# Patient Record
Sex: FEMALE | Race: BLACK OR AFRICAN AMERICAN
[De-identification: names, ages, dates, MRNs, and addresses within clinical notes are randomized per-mention and may not be internally consistent; named-entity substitution may affect disease eponyms.]

---

## 2018-02-19 ENCOUNTER — HOSPITAL ENCOUNTER (INPATIENT)
Dept: HOSPITAL 62 - ER | Age: 78
LOS: 3 days | Discharge: HOME HEALTH | DRG: 683 | End: 2018-02-22
Attending: FAMILY MEDICINE | Admitting: FAMILY MEDICINE
Payer: MEDICARE

## 2018-02-19 ENCOUNTER — HOSPITAL ENCOUNTER (OUTPATIENT)
Dept: HOSPITAL 62 - OD | Age: 78
End: 2018-02-19
Attending: FAMILY MEDICINE
Payer: MEDICARE

## 2018-02-19 DIAGNOSIS — Z79.899: ICD-10-CM

## 2018-02-19 DIAGNOSIS — Z53.9: Primary | ICD-10-CM

## 2018-02-19 DIAGNOSIS — Z87.891: ICD-10-CM

## 2018-02-19 DIAGNOSIS — I10: ICD-10-CM

## 2018-02-19 DIAGNOSIS — Z90.49: ICD-10-CM

## 2018-02-19 DIAGNOSIS — E86.0: ICD-10-CM

## 2018-02-19 DIAGNOSIS — E03.9: ICD-10-CM

## 2018-02-19 DIAGNOSIS — B95.1: ICD-10-CM

## 2018-02-19 DIAGNOSIS — E78.5: ICD-10-CM

## 2018-02-19 DIAGNOSIS — K21.9: ICD-10-CM

## 2018-02-19 DIAGNOSIS — E87.1: ICD-10-CM

## 2018-02-19 DIAGNOSIS — Z88.6: ICD-10-CM

## 2018-02-19 DIAGNOSIS — N17.9: Primary | ICD-10-CM

## 2018-02-19 LAB
ADD MANUAL DIFF: NO
ALBUMIN SERPL-MCNC: 4.6 G/DL (ref 3.5–5)
ALP SERPL-CCNC: 82 U/L (ref 38–126)
ALT SERPL-CCNC: 23 U/L (ref 9–52)
ANION GAP SERPL CALC-SCNC: 17 MMOL/L (ref 5–19)
AST SERPL-CCNC: 29 U/L (ref 14–36)
BASOPHILS # BLD AUTO: 0 10^3/UL (ref 0–0.2)
BASOPHILS NFR BLD AUTO: 0.6 % (ref 0–2)
BILIRUB DIRECT SERPL-MCNC: 0.6 MG/DL (ref 0–0.4)
BILIRUB SERPL-MCNC: 0.9 MG/DL (ref 0.2–1.3)
BUN SERPL-MCNC: 29 MG/DL (ref 7–20)
CALCIUM: 9.4 MG/DL (ref 8.4–10.2)
CHLORIDE SERPL-SCNC: 84 MMOL/L (ref 98–107)
CO2 SERPL-SCNC: 27 MMOL/L (ref 22–30)
CREAT UR-MCNC: 216.8 MG/DL (ref 15–278)
EOSINOPHIL # BLD AUTO: 0.1 10^3/UL (ref 0–0.6)
EOSINOPHIL NFR BLD AUTO: 1 % (ref 0–6)
ERYTHROCYTE [DISTWIDTH] IN BLOOD BY AUTOMATED COUNT: 14.3 % (ref 11.5–14)
GLUCOSE SERPL-MCNC: 89 MG/DL (ref 75–110)
HCT VFR BLD CALC: 38.9 % (ref 36–47)
HGB BLD-MCNC: 13.1 G/DL (ref 12–15.5)
LYMPHOCYTES # BLD AUTO: 1.7 10^3/UL (ref 0.5–4.7)
LYMPHOCYTES NFR BLD AUTO: 24.9 % (ref 13–45)
MCH RBC QN AUTO: 32.9 PG (ref 27–33.4)
MCHC RBC AUTO-ENTMCNC: 33.5 G/DL (ref 32–36)
MCV RBC AUTO: 98 FL (ref 80–97)
MONOCYTES # BLD AUTO: 0.8 10^3/UL (ref 0.1–1.4)
MONOCYTES NFR BLD AUTO: 11.7 % (ref 3–13)
NEUTROPHILS # BLD AUTO: 4.3 10^3/UL (ref 1.7–8.2)
NEUTS SEG NFR BLD AUTO: 61.8 % (ref 42–78)
OSMOLALITY,URINE: 237 MOSM/KG (ref 300–900)
PLATELET # BLD: 276 10^3/UL (ref 150–450)
POTASSIUM SERPL-SCNC: 3.8 MMOL/L (ref 3.6–5)
PROT SERPL-MCNC: 8.4 G/DL (ref 6.3–8.2)
RBC # BLD AUTO: 3.97 10^6/UL (ref 3.72–5.28)
SODIUM SERPL-SCNC: 127.6 MMOL/L (ref 137–145)
TOTAL CELLS COUNTED % (AUTO): 100 %
URINE SODIUM: 22 MMOL/L (ref 30–90)
WBC # BLD AUTO: 6.9 10^3/UL (ref 4–10.5)

## 2018-02-19 PROCEDURE — 84300 ASSAY OF URINE SODIUM: CPT

## 2018-02-19 PROCEDURE — 82550 ASSAY OF CK (CPK): CPT

## 2018-02-19 PROCEDURE — 99285 EMERGENCY DEPT VISIT HI MDM: CPT

## 2018-02-19 PROCEDURE — 93880 EXTRACRANIAL BILAT STUDY: CPT

## 2018-02-19 PROCEDURE — 76770 US EXAM ABDO BACK WALL COMP: CPT

## 2018-02-19 PROCEDURE — 80053 COMPREHEN METABOLIC PANEL: CPT

## 2018-02-19 PROCEDURE — 36415 COLL VENOUS BLD VENIPUNCTURE: CPT

## 2018-02-19 PROCEDURE — 93976 VASCULAR STUDY: CPT

## 2018-02-19 PROCEDURE — 83935 ASSAY OF URINE OSMOLALITY: CPT

## 2018-02-19 PROCEDURE — 80048 BASIC METABOLIC PNL TOTAL CA: CPT

## 2018-02-19 PROCEDURE — 70450 CT HEAD/BRAIN W/O DYE: CPT

## 2018-02-19 PROCEDURE — 85025 COMPLETE CBC W/AUTO DIFF WBC: CPT

## 2018-02-19 PROCEDURE — 82570 ASSAY OF URINE CREATININE: CPT

## 2018-02-19 PROCEDURE — 93306 TTE W/DOPPLER COMPLETE: CPT

## 2018-02-19 PROCEDURE — 96360 HYDRATION IV INFUSION INIT: CPT

## 2018-02-19 PROCEDURE — 87088 URINE BACTERIA CULTURE: CPT

## 2018-02-19 PROCEDURE — 87086 URINE CULTURE/COLONY COUNT: CPT

## 2018-02-19 PROCEDURE — 83930 ASSAY OF BLOOD OSMOLALITY: CPT

## 2018-02-19 RX ADMIN — SODIUM CHLORIDE PRN ML: 9 INJECTION, SOLUTION INTRAVENOUS at 23:34

## 2018-02-19 NOTE — ER DOCUMENT REPORT
ED Medical Screen (RME)





- General


Chief Complaint: Abnormal Lab Results


Stated Complaint: POSSIBLE LOW SODIUM


Time Seen by Provider: 02/19/18 19:53


Mode of Arrival: Wheelchair


Information source: Patient, Relative


Notes: 





78-year-old female presented ED for complaint of low sodium.  She states that 

she went to Dr. Gibbs's office last week he enzo blood and then he called her 

today told her to go and get blood drawn and then come to his office.  When she 

got it to his office today she was told to come to the emergency room to get 

admitted for her low sodium.  She has a set of labs with her from results from 

the doctor's office.  She states she is also hurting from her arthritis that is 

chronic.  Patient is alert and oriented.








I have greeted and performed a rapid initial assessment of this patient.  A 

comprehensive ED assessment and evaluation of the patient, analysis of test 

results and completion of medical decision making process will be conducted by 

an additional ED providers.


TRAVEL OUTSIDE OF THE [place] IN LAST 30 DAYS: No





- Related Data


Allergies/Adverse Reactions: 


 





aspirin Allergy (Verified 02/19/18 18:19)


 











Physical Exam





- Vital signs


Vitals: 





 











Temp Pulse Resp BP Pulse Ox


 


 98.5 F   76   21 H  151/70 H  99 


 


 02/19/18 18:43  02/19/18 18:43  02/19/18 18:43  02/19/18 18:43  02/19/18 18:43














Course





- Vital Signs


Vital signs: 





 











Temp Pulse Resp BP Pulse Ox


 


 98.5 F   76   21 H  151/70 H  99 


 


 02/19/18 18:43  02/19/18 18:43  02/19/18 18:43  02/19/18 18:43  02/19/18 18:43

## 2018-02-19 NOTE — ER DOCUMENT REPORT
ED General





- General


Chief Complaint: Abnormal Lab Results


Stated Complaint: POSSIBLE LOW SODIUM


Time Seen by Provider: 02/19/18 19:53


Mode of Arrival: Wheelchair


Information source: Relative


TRAVEL OUTSIDE OF THE [place] IN LAST 30 DAYS: No





- HPI


Notes: 





78-year-old lady presented today from Dr. Kendrick's office for evaluation of 

electrolyte abnormalities.  Patient was seen at the office today and had lab 

work drawn and noted to have low sodium.  Patient was referred here for 

admission.  After speaking with family patient has been feeling generally tired 

and fatigued, patient has chronic arthritic pain in her knees.  Patient also 

has been feeling slightly cold and places where people normally feel warm.  No 

chest pain, no shortness of breath, no palpitations.





- Related Data


Allergies/Adverse Reactions: 


 





aspirin Allergy (Verified 02/19/18 18:19)


 











Past Medical History





- General


Information source: Patient, Relative





- Social History


Smoking Status: Never Smoker


Family History: None





Review of Systems





- Review of Systems


Notes: 





REVIEW OF SYSTEMS:


 CONSTITUTIONAL: -fevers, -chills, + generalized fatigue, + tired


 EENT: -eye pain, + knee pain difficulty swallowing, -nasal congestion


 CARDIOVASCULAR: -chest pain, -syncope.


 RESPIRATORY: -cough, -SOB


 GASTROINTESTINAL: -abdominal pain, -nausea, -vomiting, -diarrhea


 GENITOURINARY: -dysuria, -hematuria


 MUSCULOSKELETAL: -back pain, -neck pain, + knee pain


 SKIN: -rash or skin lesions.


 HEMATOLOGIC: -easy bruising or bleeding.


 LYMPHATIC: -swollen, enlarged glands.


 NEUROLOGICAL: -altered mental status or loss of consciousness, -headache, -

neurologic symptoms


 PSYCHIATRIC: -anxiety, -depression.


 ALL OTHER SYSTEMS REVIEWED AND NEGATIVE.





Physical Exam





- Vital signs


Vitals: 


 











Temp Pulse Resp BP Pulse Ox


 


 98.5 F   77   21 H  151/70 H  99 


 


 02/19/18 18:43  02/19/18 18:43  02/19/18 18:43  02/19/18 18:43  02/19/18 18:43














- Notes


Notes: 





Reviewed vital signs and nursing note as charted by RN. 


CONSTITUTIONAL: Alert and oriented


HEAD: Normocephalic; atraumatic


EYES: PERRL


ENT: normal nose; no rhinorrhea; dry mucous membranes


NECK: Supple without meningismus; non-tender; no cervical lymphadenopathy, no 

masses


CARD: Bradycardia; no murmurs, no clicks, no rubs, no gallops; symmetric distal 

pulses


RESP: Normal chest excursion without splinting or tachypnea; breath sounds 

clear and equal bilaterally


ABD/GI: Normal bowel sounds; non-distended; soft,


BACK:  The back appears normal and is non-tender to palpation


EXT: Normal ROM in all joints; non-tender to palpation; no cyanosis, no 

effusions, no edema   


SKIN: Normal color for age and race; warm; dry; good turgor; capillary refill < 

2 seconds; no acute lesions noted


NEURO: .Cranial nerves 3-12 intact. Motor strength 5/5 bilaterally. Sensation 

intact to touch bilaterally. No pronator drift. Finger to nose intact 

bilaterally


PSYCH: The patient's mood and manner are appropriate. Grooming and personal 

hygiene are appropriate.








Course





- Re-evaluation


Re-evalutation: 





02/19/18 20:51


78-year-old here for evaluation of abnormal lab findings


Patient is here from the office of Dr. Kendrick


I have reviewed the lab work and noted that patient has worsening kidney 

function with notable hyponatremia of 120, patient also has elevated TSH back 

and be contributing to her electrolyte abnormalities


We will give patient gentle hydration


We will obtain basic lab work, will consult Dr. Kendrick





02/19/18 22:16


Reassessment 10 PM


Patient has improvement of her sodium compared to her sodium 2 days prior


Patient does have worsening kidney function, discussed the case with Dr. Kendrick, 

agree with admission for further workup


Recommended ultrasound of the kidneys, will obtain that


I will add on urine as well as CK level


Admit to inpatient





- Vital Signs


Vital signs: 


 











Temp Pulse Resp BP Pulse Ox


 


 98.5 F   76   21 H  151/70 H  98 


 


 02/19/18 18:43  02/19/18 18:43  02/19/18 18:43  02/19/18 18:43  02/19/18 20:18














- Laboratory


Result Diagrams: 


 02/19/18 20:45





 02/19/18 20:45


Laboratory results interpreted by me: 


 











  02/19/18 02/19/18





  20:45 20:45


 


MCV  98 H 


 


RDW  14.3 H 


 


Sodium   127.6 L


 


Chloride   84 L


 


BUN   29 H


 


Creatinine   2.67 H


 


Est GFR ( Amer)   21 L


 


Est GFR (Non-Af Amer)   17 L


 


Direct Bilirubin   0.6 H


 


Total Protein   8.4 H














Discharge





- Discharge


Clinical Impression: 


 LUKE (acute kidney injury), Hyponatremia





Condition: Stable


Disposition: ADMITTED AS INPATIENT


Admitting Provider: Sai


Unit Admitted: IMCU


Referrals: 


IVONNE KENDRICK MD [Primary Care Provider] - Follow up as needed

## 2018-02-20 LAB
ADD MANUAL DIFF: NO
ANION GAP SERPL CALC-SCNC: 12 MMOL/L (ref 5–19)
BASOPHILS # BLD AUTO: 0 10^3/UL (ref 0–0.2)
BASOPHILS NFR BLD AUTO: 0.6 % (ref 0–2)
BUN SERPL-MCNC: 33 MG/DL (ref 7–20)
CALCIUM: 8.8 MG/DL (ref 8.4–10.2)
CHLORIDE SERPL-SCNC: 91 MMOL/L (ref 98–107)
CO2 SERPL-SCNC: 27 MMOL/L (ref 22–30)
EOSINOPHIL # BLD AUTO: 0.1 10^3/UL (ref 0–0.6)
EOSINOPHIL NFR BLD AUTO: 2.3 % (ref 0–6)
ERYTHROCYTE [DISTWIDTH] IN BLOOD BY AUTOMATED COUNT: 14 % (ref 11.5–14)
GLUCOSE SERPL-MCNC: 87 MG/DL (ref 75–110)
HCT VFR BLD CALC: 33.6 % (ref 36–47)
HGB BLD-MCNC: 11.3 G/DL (ref 12–15.5)
LYMPHOCYTES # BLD AUTO: 1.6 10^3/UL (ref 0.5–4.7)
LYMPHOCYTES NFR BLD AUTO: 24.1 % (ref 13–45)
MCH RBC QN AUTO: 32.4 PG (ref 27–33.4)
MCHC RBC AUTO-ENTMCNC: 33.6 G/DL (ref 32–36)
MCV RBC AUTO: 96 FL (ref 80–97)
MONOCYTES # BLD AUTO: 0.7 10^3/UL (ref 0.1–1.4)
MONOCYTES NFR BLD AUTO: 10.5 % (ref 3–13)
NEUTROPHILS # BLD AUTO: 4 10^3/UL (ref 1.7–8.2)
NEUTS SEG NFR BLD AUTO: 62.5 % (ref 42–78)
PLATELET # BLD: 239 10^3/UL (ref 150–450)
POTASSIUM SERPL-SCNC: 3.4 MMOL/L (ref 3.6–5)
RBC # BLD AUTO: 3.49 10^6/UL (ref 3.72–5.28)
SODIUM SERPL-SCNC: 130.3 MMOL/L (ref 137–145)
TOTAL CELLS COUNTED % (AUTO): 100 %
WBC # BLD AUTO: 6.5 10^3/UL (ref 4–10.5)

## 2018-02-20 RX ADMIN — AMLODIPINE BESYLATE SCH MG: 5 TABLET ORAL at 21:32

## 2018-02-20 RX ADMIN — AMLODIPINE BESYLATE SCH MG: 5 TABLET ORAL at 10:08

## 2018-02-20 RX ADMIN — HYDRALAZINE HYDROCHLORIDE SCH MG: 25 TABLET, FILM COATED ORAL at 14:19

## 2018-02-20 RX ADMIN — ATORVASTATIN CALCIUM SCH MG: 10 TABLET, FILM COATED ORAL at 21:32

## 2018-02-20 RX ADMIN — HYDRALAZINE HYDROCHLORIDE SCH MG: 25 TABLET, FILM COATED ORAL at 21:32

## 2018-02-20 RX ADMIN — LANSOPRAZOLE SCH MG: 30 TABLET, ORALLY DISINTEGRATING, DELAYED RELEASE ORAL at 10:08

## 2018-02-20 RX ADMIN — HYDRALAZINE HYDROCHLORIDE SCH MG: 25 TABLET, FILM COATED ORAL at 06:03

## 2018-02-20 RX ADMIN — ENOXAPARIN SODIUM SCH MG: 30 INJECTION SUBCUTANEOUS at 10:07

## 2018-02-20 RX ADMIN — SODIUM CHLORIDE PRN ML: 9 INJECTION, SOLUTION INTRAVENOUS at 18:00

## 2018-02-20 RX ADMIN — LEVOTHYROXINE SODIUM SCH MG: 25 TABLET ORAL at 10:08

## 2018-02-20 NOTE — RADIOLOGY REPORT (SQ)
EXAM DESCRIPTION: U/S RETROPERITON (RENAL/AORTA)



CLINICAL HISTORY: worsening kidney function



COMPARISON: None.



TECHNIQUE: Real-time sonographic images of the retroperitoneum

were obtained using a curved multihertz transducer.



FINDINGS:







The right kidney measures 9.1 cm in length. The left kidney

measures 10.4 cm in length. No solid renal mass, shadowing renal

calculi, or hydronephrosis. 1.9 cm superior pole right renal

cyst.



Possible echogenic debris within the urinary bladder. Normal

urinary bladder wall thickness.









IMPRESSION:



1. Possible echogenic debris in the urinary bladder. Correlation

with urinalysis recommended.



2. No definite abnormality identified in the kidneys.

## 2018-02-20 NOTE — PDOC H&P
History of Present Illness


Admission Date/PCP: 


  18 22:17





  IVONNE KENDRICK MD





Patient complains of: Acute renal failure and hyponatremia


History of Present Illness: 


ONEL GORDON is a 78 year old female


This 78-year-old females came to the office last month up to 4 years not taking 

any blood pressure medications and blood pressure was running 190/110 range 

with completely asymptomatic patient'sAnd the patient's initially started on 

her Diovan HCTZ which patient was taking exforge  in the pastAnd then will get 

down to slowly week by week for the last 3 weeks the blood pressures to 150-160 

range and also add the Norvasc 5 mg twice a day


Last Monday patient's sodium was completely normal and creatinine was 1.03 with 

a recheck again the patient's creatinine was good to up to 1.69 and patient's 

sodium was 120


Patient's brought back to the office patient was complaining some mild weakness 

and some dizziness decided to send to the emergency departments will repeat 

sodium was 127 but patient's creatinine was 2.69 and decided to admit for 

further evaluations for the acute renal failure and hyponatremia


Patient's denied any chest pain denied any shortness of the breath denied any 

headache but complaining some dizzy feeling


Patient also have a hypothyroidism which is uncontrolled with the patient's 

came was about 35 TSH and patient was started on the levothyroxine's coming 

back to the 11 range


When I saw the patient on the floor patients feeling better after starting the 

IV fluidsDenied any headache denied any bloody wheezing denied any dizziness 

denied any chest pain





Past Medical History


Cardiac Medical History: Reports: Hyperlipidema, Hypertension


GI Medical History: Reports: Gastroesophageal Reflux Disease





Past Surgical History


Past Surgical History: Reports:  Section, Cholecystectomy





Social History


Smoking Status: Former Smoker


Last Time Smoked: 


Frequency of Alcohol Use: None


Hx Prescription Drug Abuse: No





- Advance Directive


Resuscitation Status: Full Code





Family History


Family History: None, Reviewed & Not Pertinent


Parental Family History Reviewed: Yes


Children Family History Reviewed: Yes


Sibling(s) Family History Reviewed.: Yes





Medication/Allergy


Home Medications: 








Amlodipine Besylate [Norvasc 5 mg Tablet] 5 mg PO DAILY 18 


Levothyroxine Sodium [Synthroid 0.025 mg Tablet] 0.025 mg PO Q6AM 18 


Omeprazole 40 mg PO DAILY 18 


Pravastatin Sodium [Pravachol] 40 mg PO DAILY 18 


Valsartan/Hydrochlorothiazide [Valsartan-Hctz 160-12.5 mg Tab] 1 tab PO DAILY  








Allergies/Adverse Reactions: 


 





aspirin Allergy (Verified 18 18:19)


 











Review of Systems


Constitutional: PRESENT: fatigue, weakness.  ABSENT: chills, fever(s), headache(

s), weight gain, weight loss


Eyes: ABSENT: visual disturbances


Ears: ABSENT: hearing changes


Cardiovascular: ABSENT: chest pain, dyspnea on exertion, edema, orthropnea, 

palpitations


Respiratory: ABSENT: cough, hemoptysis


Gastrointestinal: ABSENT: abdominal pain, constipation, diarrhea, hematemesis, 

hematochezia, nausea, vomiting


Genitourinary: ABSENT: dysuria, hematuria


Musculoskeletal: ABSENT: joint swelling


Integumentary: ABSENT: rash, wounds


Neurological: ABSENT: abnormal gait, abnormal speech, confusion, dizziness, 

focal weakness, syncope


Psychiatric: ABSENT: anxiety, depression, homidical ideation, suicidal ideation


Endocrine: ABSENT: cold intolerance, heat intolerance, menstrual abnormalities, 

polydipsia, polyuria


Hematologic/Lymphatic: ABSENT: easy bleeding, easy bruising, lymphadenopathy





Physical Exam


Vital Signs: 


 











Temp Pulse Resp BP Pulse Ox


 


 97.6 F   55 L  14   138/52 H  96 


 


 18 08:05  18 08:05  18 08:05  18 08:05  18 08:05








 Intake & Output











 18





 06:59 06:59 06:59


 


Weight  78.7 kg 











General appearance: PRESENT: no acute distress, well-developed, well-nourished


Head exam: PRESENT: atraumatic, normocephalic


Eye exam: PRESENT: conjunctiva pink, EOMI, PERRLA.  ABSENT: scleral icterus


Ear exam: PRESENT: normal external ear exam


Mouth exam: PRESENT: moist, tongue midline


Neck exam: PRESENT: full ROM.  ABSENT: carotid bruit, JVD, lymphadenopathy, 

thyromegaly


Respiratory exam: PRESENT: clear to auscultation jus


Cardiovascular exam: PRESENT: RRR.  ABSENT: diastolic murmur, rubs, systolic 

murmur


Pulses: PRESENT: normal dorsalis pedis pul, +2 pedal pulses bilateral


Vascular exam: PRESENT: normal capillary refill


GI/Abdominal exam: PRESENT: normal bowel sounds, soft.  ABSENT: distended, 

guarding, mass, organolmegaly, rebound, tenderness


Rectal exam: PRESENT: deferred


Extremities exam: ABSENT: full ROM, left AKA, right AKA, left BKA, right BKA, 

calf tenderness, joint swelling, pedal edema, tenderness, other


Musculoskeletal exam: PRESENT: ambulatory


Neurological exam: PRESENT: alert, awake, oriented to person, oriented to place

, oriented to time, oriented to situation, CN II-XII grossly intact.  ABSENT: 

motor sensory deficit


Psychiatric exam: PRESENT: appropriate affect, normal mood.  ABSENT: homicidal 

ideation, suicidal ideation


Skin exam: PRESENT: dry, intact, warm.  ABSENT: cyanosis, rash





Results


Laboratory Results: 


 





 18 05:00 





 18 05:00 





 











  18





  22:19 05:00 05:00


 


WBC   6.5 


 


RBC   3.49 L 


 


Hgb   11.3 L 


 


Hct   33.6 L 


 


MCV   96 


 


MCH   32.4 


 


MCHC   33.6 


 


RDW   14.0 


 


Plt Count   239 


 


Seg Neutrophils %   62.5 


 


Lymphocytes %   24.1 


 


Monocytes %   10.5 


 


Eosinophils %   2.3 


 


Basophils %   0.6 


 


Absolute Neutrophils   4.0 


 


Absolute Lymphocytes   1.6 


 


Absolute Monocytes   0.7 


 


Absolute Eosinophils   0.1 


 


Absolute Basophils   0.0 


 


Sodium    130.3 L


 


Potassium    3.4 L


 


Chloride    91 L


 


Carbon Dioxide    27


 


Anion Gap    12


 


BUN    33 H


 


Creatinine    2.26 H


 


Est GFR ( Amer)    25 L


 


Est GFR (Non-Af Amer)    21 L


 


Glucose    87


 


Calcium    8.8


 


Urine Osmolality  237 L  











Impressions: 


 





Head CT  18 00:00


IMPRESSION:


1.  No acute intracranial abnormality by CT criteria.


 


This exam was performed according to our departmental


dose-optimization program, which includes automated exposure


control, adjustment of the mA and/or kV according to patient size


and/or use of iterative reconstruction technique.


 








Renal Ultrasound  18 22:01


IMPRESSION:


 


1. Possible echogenic debris in the urinary bladder. Correlation


with urinalysis recommended.


 


2. No definite abnormality identified in the kidneys.


 








Vascular Ultrasound  18 00:00


IMPRESSION:  NO DOPPLER EVIDENCE OF HEMODYNAMICALLY SIGNIFICANT RENAL ARTERY 

STENOSIS.


 














Assessment & Plan





- Diagnosis


(1) LUKE (acute kidney injury)


Is this a current diagnosis for this admission?: Yes   


Plan: 


Hospital discussed with the nephrology Dr. Zhao.  The Diovan and HCTZ start 

the patient on IV fluid and will get the ultrasound of the kidney to further 

evaluateCheck the urine culture








(2) Hyponatremia


Is this a current diagnosis for this admission?: Yes   


Plan: 


Severe hyponatremia with some symptomatic currently most likely related to the 

dehydration's with patient suddenly try to control the diets and with the 

hydrochlorothiazide will stop the Diovan HCTZ started on IV fluid








(3) Uncontrolled hypertension


Is this a current diagnosis for this admission?: Yes   


Plan: 


We will continues on Norvasc 5 mg p.o. twice a day and add the hydralazine 25 

mg p.o. every 8 and stop the Diovan HCTZ will also get the vascular ultrasounds 

to rule out any renal artery stenosis and 2D echocardiogram








(4) Hyperlipidemia


Qualifiers: 


   Hyperlipidemia type: unspecified   Qualified Code(s): E78.5 - Hyperlipidemia

, unspecified   


Is this a current diagnosis for this admission?: Yes   


Plan: 


Continues to statin








(5) Hypothyroidism


Qualifiers: 


   Hypothyroidism type: unspecified   Qualified Code(s): E03.9 - Hypothyroidism

, unspecified   


Is this a current diagnosis for this admission?: Yes   


Plan: 


Continues to levothyroxine's








- Time


Time Spent: 30 to 50 Minutes


Medications reviewed and adjusted accordingly: Yes


Anticipated discharge: Home


Within: Other





- Inpatient Certification


Medical Necessity: Need Close Monitoring Due to Risk of Patient Decompensation, 

Need For IV Fluids


Post Hospital Care: D/C Planner Documentation





- Plan Summary


Plan Summary: 





Continues IV fluid will get the 2D echo and vascular ultrasounds and continues 

to monitor the patient's.  Discussed with the daughter about the patient's 

current conditions

## 2018-02-20 NOTE — XCELERA REPORT
[address] [address]

                             Tel: [phone]

                             Fax: [phone]



                    Transthoracic Echocardiogram Report

____________________________________________________________________________



Name: ONEL GORDON

MRN: [id number]                Age: 78 yrs

Gender: Female                 : 1940

Patient Status: Inpatient      Patient Location: [address] [place] [address]

Account #: [id number]

Study Date: 2018 03:23 PM

Accession #: [id number]

____________________________________________________________________________



Height: 64 in        Weight: 169 lb        BSA: 1.8 m2



____________________________________________________________________________

Procedure: A complete two-dimensional transthoracic echocardiogram was

performed (2D, M-mode, spectral and color flow Doppler). The study was

technically adequate with some images being suboptimal in quality.

Reason For Study: uncontrol htn





Ordering Physician: IVONNE KENDRICK

Performed By: Carmella Caruso

____________________________________________________________________________





Interpretation Summary

The left ventricular ejection fraction is normal.

Doppler measurements suggest pseudonormalized left ventricular relaxation,

which is associated with grade II/IV or mild to moderate diastolic

dysfunction

There is mild concentric left ventricular hypertrophy.

The left ventricle is grossly normal size.

Wall motion cannot be accurately commented on, but no definite regional

wall motion abnormalities noted.

The right ventricular systolic function is normal.

The left atrial size is normal.

The right atrium is normal in size

There is a mild amount of mitral regurgitation

There is no mitral valve stenosis.

There is a moderate amount of aortic regurgitation

There is mild aortic stenosis

There is a peak gradient of 25-30 mm of Hg.

There is a trace to mild amount of tricuspid regurgitation

There is mild pulmonary hypertension by echo

Right ventricular systolic pressure is estimated to be elevated at 30-

40mmHg.

Minimal pericardial effusion.



____________________________________________________________________________



MMode/2D Measurements & Calculations

RVDd: 2.2 cm       LVIDd: 5.2 cmFS: 41.5 %           Ao root diam: 2.9 cm

IVSd: 1.1 cm       LVIDs: 3.0 cmEDV(Teich): 127.3 ml

                   LVPWd: 1.1 cmESV(Teich): 35.6 ml  Ao root area: 6.7 cm2

                                EF(Teich): 72.1 %    LA dimension: 3.3 cm



        _____________________________________________________________

LVOT diam: 1.9 cm

LVOT area: 2.8 cm2





Doppler Measurements & Calculations

MV E max amberly:     MV P1/2t max amberly:   Ao V2 max:        AI max amberly:

74.5 cm/sec       74.0 cm/sec         267.1 cm/sec      495.3 cm/sec

MV A max amberly:     MV P1/2t: 61.5 msec Ao max PG:        AI max P.1 cm/sec      MVA(P1/2t): 3.6 cm2 28.5 mmHg         98.1 mmHg

MV E/A: 0.71      MV dec slope:       NELDA(V,D): 1.6 cm2 AI dec slope:

                  352.9 cm/sec2                         282.0 cm/sec2

                                                        AI P1/2t:

                                                        514.5 msec

        _____________________________________________________________



LV V1 max PG:     PA V2 max:          TR max amberly:

9.9 mmHg          124.9 cm/sec        289.1 cm/sec

LV V1 max:        PA max P.2 mmHg TR max P.0 cm/sec                          33.4 mmHg

____________________________________________________________________________



Left Ventricle

The left ventricle is grossly normal size. There is mild concentric left

ventricular hypertrophy. The left ventricular ejection fraction is normal.

Doppler measurements suggest pseudonormalized left ventricular relaxation,

which is associated with grade II/IV or mild to moderate diastolic

dysfunction. Wall motion cannot be accurately commented on, but no definite

regional wall motion abnormalities noted.



Right Ventricle

The right ventricle is grossly normal size. There is normal right

ventricular wall thickness. The right ventricular systolic function is

normal.



Atria

The right atrium is normal in size. The left atrial size is normal.

Interarterial septum not well visualized and not well dopplered. Cannot

comment on ASD/PFO presence.



Mitral Valve

The mitral valve is grossly normal. There is no mitral valve stenosis.

There is a mild amount of mitral regurgitation.



Aortic Valve

The aortic valve is mildly calcified. There is mild aortic stenosis. There

is a peak gradient of 25-30 mm of Hg. There is a moderate amount of aortic

regurgitation.





Tricuspid Valve

The tricuspid valve is not well visualized secondary to technical

limitations. There is no tricuspid stenosis. There is a trace to mild

amount of tricuspid regurgitation. There is mild pulmonary hypertension by

echo. Right ventricular systolic pressure is estimated to be elevated at

30-40mmHg.



Pulmonic Valve

The pulmonic valve is not well visualized.



Great Vessels

The aortic root is not well visualized but is probably normal size. The

inferior vena cava was not well visualized.



Effusions

Minimal pericardial effusion.





____________________________________________________________________________

Electronically signed by:      Philip Gonsalez      on 2018 06:31 PM



CC: IVONNE KENDRICK

>

Philip Gonsalez

## 2018-02-20 NOTE — RADIOLOGY REPORT (SQ)
EXAM DESCRIPTION:  U/S LTD DUPLEX ART/DILCIA FLOW



COMPLETED DATE/TIME:  2/20/2018 9:48 am



REASON FOR STUDY:  Renal Artery US  " r/o renal stenosis"



COMPARISON:  Renal ultrasound 2/20/2018



TECHNIQUE:  Realtime and static grayscale images acquired. Selected color Doppler, velocities and spe
ctral images recorded.



LIMITATIONS:  Difficult to visualize the renal artery origins off the aorta



FINDINGS:  RIGHT KIDNEY:

RENAL ARTERY VELOCITIES: At the hilum, 54 cm/sec.  Segmental artery velocity 49 cm/sec.

RENAL VEIN:  Color doppler flow present, patent.

VELOCITY RATIO: 0.7.  Normal waveforms.

KIDNEY:  9 cm in length with diffuse cortical thinning and increased echogenicity from medical renal 
disease.  2 cm right upper pole cortical cyst.   No hydronephrosis.  No gross stones.

LEFT KIDNEY:

RENAL ARTERY VELOCITIES: At the hilum, 56 cm/sec.  Segmental artery velocity 37 cm/sec.

RENAL VEIN:  Color doppler flow present, patent.

VELOCITY RATIO: 1.5. Normal waveforms.

KIDNEY:  10.4 cm in length with diffuse cortical thinning and increased echogenicity from medical lupe
al disease.   No stones.  No hydronephrosis.

BLADDER: Unremarkable

OTHER: No other significant finding.



IMPRESSION:  NO DOPPLER EVIDENCE OF HEMODYNAMICALLY SIGNIFICANT RENAL ARTERY STENOSIS.



COMMENT:  NORMAL RENAL ARTERY/AORTA VELOCITY RATIO IS LESS THAN OR EQUAL TO 3.5.



TECHNICAL DOCUMENTATION:  JOB ID:  [id number]

 2011 [employer]- All Rights Reserved

## 2018-02-20 NOTE — RADIOLOGY REPORT (SQ)
EXAM DESCRIPTION: CT HEAD WITHOUT



CLINICAL HISTORY: dizziness



COMPARISON: None available



TECHNIQUE: Axial CT of the head obtained from the skull apex to

the skull base without contrast.



FINDINGS: 

No acute intracranial hemorrhage identified. No mass, mass

effect, shift of the midline, abnormal extra-axial fluid

collection or CT evidence of acute ischemic change identified.

The ventricular system and sulcal spaces are mildly enlarged

compatible with mild cerebral atrophy.  Confluent areas of

hypodensity throughout the supratentorial white matter are

nonspecific and may be related to chronic small vessel ischemic

change.



The visualized paranasal sinuses and the mastoids are clear.

  No skull fracture identified.  Visualized orbits and globes are

unremarkable. Atherosclerotic calcification of the intracranial

internal carotid arteries.



DLP:1033.75 mGy-cm



IMPRESSION:

1.  No acute intracranial abnormality by CT criteria.



This exam was performed according to our departmental

dose-optimization program, which includes automated exposure

control, adjustment of the mA and/or kV according to patient size

and/or use of iterative reconstruction technique.

## 2018-02-20 NOTE — RADIOLOGY REPORT (SQ)
EXAM DESCRIPTION:  CAROTID DOPPLER



COMPLETED DATE/TIME:  2/20/2018 4:44 pm



REASON FOR STUDY:  dizziness



COMPARISON:  CT brain 2/19/2018



TECHNIQUE:  Grayscale ultrasound, Doppler velocity and spectra, and color Doppler images acquired of 
the extra-cranial carotid and vertebral arteries. Images stored on PACS.



LIMITATIONS:  None.



FINDINGS:  RIGHT CAROTID

CCA Velocities: Within normal limits.

ICA Velocities

 Peak systolic 1.1 m/s.

 End diastolic 0.29 m/s.

Proximal ICA/CCA peak systolic ratio 1.4.

Spectra normal. No significant plaque.

LEFT CAROTID

CCA Velocities: Within normal limits.

ICA Velocities

 Peak systolic 0.8 m/s.

 End diastolic 0.18 m/s.

Proximal ICA/CCA peak systolic ratio 1.8.

Spectra normal. No significant plaque.

VERTEBRAL ARTERIES: Antegrade flow.  Normal waveforms.

SUBCLAVIAN ARTERIES: Not examined.

OTHER: No other significant finding.



IMPRESSION:  NO HEMODYNAMICALLY SIGNIFICANT STENOSIS.



COMMENT:  Quality ID #195:  Velocity criteria are extrapolated from the diameter data as defined by t
he Society of Radiologists in Ultrasound Consensus Conference. Radiology 2003: 229; 340-346.



TECHNICAL DOCUMENTATION:  JOB ID:  [id number]

 2011 [employer]- All Rights Reserved

## 2018-02-21 LAB
ADD MANUAL DIFF: NO
ANION GAP SERPL CALC-SCNC: 13 MMOL/L (ref 5–19)
BASOPHILS # BLD AUTO: 0.1 10^3/UL (ref 0–0.2)
BASOPHILS NFR BLD AUTO: 0.9 % (ref 0–2)
BUN SERPL-MCNC: 21 MG/DL (ref 7–20)
CALCIUM: 9 MG/DL (ref 8.4–10.2)
CHLORIDE SERPL-SCNC: 99 MMOL/L (ref 98–107)
CO2 SERPL-SCNC: 24 MMOL/L (ref 22–30)
EOSINOPHIL # BLD AUTO: 0.1 10^3/UL (ref 0–0.6)
EOSINOPHIL NFR BLD AUTO: 2 % (ref 0–6)
ERYTHROCYTE [DISTWIDTH] IN BLOOD BY AUTOMATED COUNT: 14.1 % (ref 11.5–14)
GLUCOSE SERPL-MCNC: 94 MG/DL (ref 75–110)
HCT VFR BLD CALC: 35.2 % (ref 36–47)
HGB BLD-MCNC: 11.8 G/DL (ref 12–15.5)
LYMPHOCYTES # BLD AUTO: 1.6 10^3/UL (ref 0.5–4.7)
LYMPHOCYTES NFR BLD AUTO: 23.1 % (ref 13–45)
MCH RBC QN AUTO: 32.3 PG (ref 27–33.4)
MCHC RBC AUTO-ENTMCNC: 33.6 G/DL (ref 32–36)
MCV RBC AUTO: 96 FL (ref 80–97)
MONOCYTES # BLD AUTO: 0.6 10^3/UL (ref 0.1–1.4)
MONOCYTES NFR BLD AUTO: 9.2 % (ref 3–13)
NEUTROPHILS # BLD AUTO: 4.5 10^3/UL (ref 1.7–8.2)
NEUTS SEG NFR BLD AUTO: 64.8 % (ref 42–78)
PLATELET # BLD: 238 10^3/UL (ref 150–450)
POTASSIUM SERPL-SCNC: 4 MMOL/L (ref 3.6–5)
RBC # BLD AUTO: 3.66 10^6/UL (ref 3.72–5.28)
SODIUM SERPL-SCNC: 135.9 MMOL/L (ref 137–145)
TOTAL CELLS COUNTED % (AUTO): 100 %
WBC # BLD AUTO: 6.9 10^3/UL (ref 4–10.5)

## 2018-02-21 RX ADMIN — ATORVASTATIN CALCIUM SCH MG: 10 TABLET, FILM COATED ORAL at 21:37

## 2018-02-21 RX ADMIN — CEPHALEXIN SCH MG: 500 CAPSULE ORAL at 10:08

## 2018-02-21 RX ADMIN — LANSOPRAZOLE SCH MG: 30 TABLET, ORALLY DISINTEGRATING, DELAYED RELEASE ORAL at 10:08

## 2018-02-21 RX ADMIN — HYDRALAZINE HYDROCHLORIDE SCH MG: 25 TABLET, FILM COATED ORAL at 21:37

## 2018-02-21 RX ADMIN — ENOXAPARIN SODIUM SCH MG: 30 INJECTION SUBCUTANEOUS at 10:09

## 2018-02-21 RX ADMIN — CEPHALEXIN SCH MG: 500 CAPSULE ORAL at 21:37

## 2018-02-21 RX ADMIN — AMLODIPINE BESYLATE SCH MG: 5 TABLET ORAL at 21:37

## 2018-02-21 RX ADMIN — LEVOTHYROXINE SODIUM SCH MG: 25 TABLET ORAL at 10:08

## 2018-02-21 RX ADMIN — HYDRALAZINE HYDROCHLORIDE SCH MG: 25 TABLET, FILM COATED ORAL at 13:27

## 2018-02-21 RX ADMIN — HYDRALAZINE HYDROCHLORIDE SCH MG: 25 TABLET, FILM COATED ORAL at 05:18

## 2018-02-21 RX ADMIN — SODIUM CHLORIDE PRN ML: 9 INJECTION, SOLUTION INTRAVENOUS at 06:24

## 2018-02-21 RX ADMIN — AMLODIPINE BESYLATE SCH MG: 5 TABLET ORAL at 10:08

## 2018-02-21 NOTE — PDOC PROGRESS REPORT
Subjective


Progress Note for:: 02/21/18


Subjective:: 





Patient is currently doing well patient is currently doing much better


Patient's kidney function is all improved with creatinine is 1.45 and sodium is 

all 135


Patient's carotid Doppler and echo was all stable


Patient's denied any chest pain denied any shortness of the breath


She does walk in the hallway without any problem


Reason For Visit: 


ACUTE RENAL FAILURE








Physical Exam


Vital Signs: 


 











Temp Pulse Resp BP Pulse Ox


 


 97.6 F   83   16   171/63 H  100 


 


 02/21/18 14:53  02/21/18 14:53  02/21/18 14:53  02/21/18 14:53  02/21/18 14:53








 Intake & Output











 02/20/18 02/21/18 02/22/18





 06:59 06:59 06:59


 


Intake Total  2569 300


 


Balance  2569 300


 


Weight 78.7 kg 79.3 kg 











General appearance: PRESENT: no acute distress, well-developed, well-nourished


Head exam: PRESENT: atraumatic, normocephalic


Eye exam: PRESENT: conjunctiva pink, EOMI, PERRLA.  ABSENT: scleral icterus


Ear exam: PRESENT: normal external ear exam


Mouth exam: PRESENT: moist, tongue midline


Neck exam: PRESENT: full ROM.  ABSENT: carotid bruit, JVD, lymphadenopathy, 

thyromegaly


Respiratory exam: PRESENT: clear to auscultation jus


Cardiovascular exam: PRESENT: RRR.  ABSENT: diastolic murmur, rubs, systolic 

murmur


Pulses: PRESENT: normal dorsalis pedis pul, +2 pedal pulses bilateral


Vascular exam: PRESENT: normal capillary refill


GI/Abdominal exam: PRESENT: normal bowel sounds, soft.  ABSENT: distended, 

guarding, mass, organolmegaly, rebound, tenderness


Rectal exam: PRESENT: deferred


Extremities exam: ABSENT: full ROM, left AKA, right AKA, left BKA, right BKA, 

calf tenderness, joint swelling, pedal edema, tenderness, other


Musculoskeletal exam: PRESENT: ambulatory


Neurological exam: PRESENT: alert, awake, oriented to person, oriented to place

, oriented to time, oriented to situation, CN II-XII grossly intact.  ABSENT: 

motor sensory deficit


Psychiatric exam: PRESENT: appropriate affect, normal mood.  ABSENT: homicidal 

ideation, suicidal ideation


Skin exam: PRESENT: dry, intact, warm.  ABSENT: cyanosis, rash





Results


Laboratory Results: 


 





 02/21/18 08:46 





 02/21/18 08:46 





 











  02/21/18 02/21/18





  08:46 08:46


 


WBC  6.9 


 


RBC  3.66 L 


 


Hgb  11.8 L 


 


Hct  35.2 L 


 


MCV  96 


 


MCH  32.3 


 


MCHC  33.6 


 


RDW  14.1 H 


 


Plt Count  238 


 


Seg Neutrophils %  64.8 


 


Lymphocytes %  23.1 


 


Monocytes %  9.2 


 


Eosinophils %  2.0 


 


Basophils %  0.9 


 


Absolute Neutrophils  4.5 


 


Absolute Lymphocytes  1.6 


 


Absolute Monocytes  0.6 


 


Absolute Eosinophils  0.1 


 


Absolute Basophils  0.1 


 


Sodium   135.9 L


 


Potassium   4.0


 


Chloride   99


 


Carbon Dioxide   24


 


Anion Gap   13


 


BUN   21 H


 


Creatinine   1.45 H


 


Est GFR ( Amer)   42 L


 


Est GFR (Non-Af Amer)   35 L


 


Glucose   94


 


Calcium   9.0








 





02/19/18 22:19   Clean Catch Midstream   Urine Culture - Final


                            Group B Beta Streptococcus








Impressions: 


 





Head CT  02/19/18 00:00


IMPRESSION:


1.  No acute intracranial abnormality by CT criteria.


 


This exam was performed according to our departmental


dose-optimization program, which includes automated exposure


control, adjustment of the mA and/or kV according to patient size


and/or use of iterative reconstruction technique.


 








Renal Ultrasound  02/19/18 22:01


IMPRESSION:


 


1. Possible echogenic debris in the urinary bladder. Correlation


with urinalysis recommended.


 


2. No definite abnormality identified in the kidneys.


 








Carotid Doppler Study  02/20/18 00:00


IMPRESSION:  NO HEMODYNAMICALLY SIGNIFICANT STENOSIS.


 








Vascular Ultrasound  02/20/18 00:00


IMPRESSION:  NO DOPPLER EVIDENCE OF HEMODYNAMICALLY SIGNIFICANT RENAL ARTERY 

STENOSIS.


 














Assessment & Plan





- Diagnosis


(1) LUKE (acute kidney injury)


Is this a current diagnosis for this admission?: Yes   


Plan: 


Currently all resolving continues IV fluid








(2) Hyponatremia


Is this a current diagnosis for this admission?: Yes   


Plan: 


Currently all resolved








(3) Uncontrolled hypertension


Is this a current diagnosis for this admission?: Yes   


Plan: 


Currently better controlled with this hydralazine








(4) Hyperlipidemia


Qualifiers: 


   Hyperlipidemia type: unspecified   Qualified Code(s): E78.5 - Hyperlipidemia

, unspecified   


Is this a current diagnosis for this admission?: Yes   


Plan: 


Continues to statin








(5) Hypothyroidism


Qualifiers: 


   Hypothyroidism type: unspecified   Qualified Code(s): E03.9 - Hypothyroidism

, unspecified   


Is this a current diagnosis for this admission?: Yes   


Plan: 


Continues to levothyroxine's








- Time


Time Spent with patient: 15-24 minutes


Medications reviewed and adjusted accordingly: Yes


Anticipated discharge: Home


Within: within 24 hours





- Inpatient Certification


Medical Necessity: Need Close Monitoring Due to Risk of Patient Decompensation, 

Need For IV Fluids


Post Hospital Care: D/C Planner Documentation





- Plan Summary


Plan Summary: 





Discussed with the patient and daughter about the patient's current conditions 

of the patient's continues to be improved in the next 24 Hour Discharge Homes

## 2018-02-22 VITALS — DIASTOLIC BLOOD PRESSURE: 55 MMHG | SYSTOLIC BLOOD PRESSURE: 150 MMHG

## 2018-02-22 LAB
ADD MANUAL DIFF: NO
ANION GAP SERPL CALC-SCNC: 12 MMOL/L (ref 5–19)
BASOPHILS # BLD AUTO: 0.1 10^3/UL (ref 0–0.2)
BASOPHILS NFR BLD AUTO: 1 % (ref 0–2)
BUN SERPL-MCNC: 12 MG/DL (ref 7–20)
CALCIUM: 9.4 MG/DL (ref 8.4–10.2)
CHLORIDE SERPL-SCNC: 101 MMOL/L (ref 98–107)
CO2 SERPL-SCNC: 24 MMOL/L (ref 22–30)
EOSINOPHIL # BLD AUTO: 0.1 10^3/UL (ref 0–0.6)
EOSINOPHIL NFR BLD AUTO: 1.3 % (ref 0–6)
ERYTHROCYTE [DISTWIDTH] IN BLOOD BY AUTOMATED COUNT: 14.4 % (ref 11.5–14)
GLUCOSE SERPL-MCNC: 108 MG/DL (ref 75–110)
HCT VFR BLD CALC: 34.8 % (ref 36–47)
HGB BLD-MCNC: 11.6 G/DL (ref 12–15.5)
LYMPHOCYTES # BLD AUTO: 1.8 10^3/UL (ref 0.5–4.7)
LYMPHOCYTES NFR BLD AUTO: 23.4 % (ref 13–45)
MCH RBC QN AUTO: 32.4 PG (ref 27–33.4)
MCHC RBC AUTO-ENTMCNC: 33.4 G/DL (ref 32–36)
MCV RBC AUTO: 97 FL (ref 80–97)
MONOCYTES # BLD AUTO: 0.6 10^3/UL (ref 0.1–1.4)
MONOCYTES NFR BLD AUTO: 7.7 % (ref 3–13)
NEUTROPHILS # BLD AUTO: 5 10^3/UL (ref 1.7–8.2)
NEUTS SEG NFR BLD AUTO: 66.6 % (ref 42–78)
PLATELET # BLD: 272 10^3/UL (ref 150–450)
POTASSIUM SERPL-SCNC: 3.9 MMOL/L (ref 3.6–5)
RBC # BLD AUTO: 3.59 10^6/UL (ref 3.72–5.28)
SODIUM SERPL-SCNC: 136.9 MMOL/L (ref 137–145)
TOTAL CELLS COUNTED % (AUTO): 100 %
WBC # BLD AUTO: 7.5 10^3/UL (ref 4–10.5)

## 2018-02-22 RX ADMIN — LANSOPRAZOLE SCH MG: 30 TABLET, ORALLY DISINTEGRATING, DELAYED RELEASE ORAL at 09:48

## 2018-02-22 RX ADMIN — AMLODIPINE BESYLATE SCH MG: 5 TABLET ORAL at 09:48

## 2018-02-22 RX ADMIN — CEPHALEXIN SCH MG: 500 CAPSULE ORAL at 09:47

## 2018-02-22 RX ADMIN — ENOXAPARIN SODIUM SCH MG: 30 INJECTION SUBCUTANEOUS at 09:47

## 2018-02-22 RX ADMIN — HYDRALAZINE HYDROCHLORIDE SCH MG: 25 TABLET, FILM COATED ORAL at 05:04

## 2018-02-22 RX ADMIN — HYDRALAZINE HYDROCHLORIDE SCH MG: 25 TABLET, FILM COATED ORAL at 13:10

## 2018-02-22 RX ADMIN — LEVOTHYROXINE SODIUM SCH MG: 25 TABLET ORAL at 09:48

## 2018-02-22 NOTE — PDOC DISCHARGE SUMMARY
General





- Admit/Disc Date/PCP


Admission Date/Primary Care Provider: 


  02/19/18 22:17





  IVONEN KENDRICK MD





Discharge Date: 02/22/18





- Discharge Diagnosis


(1) LUKE (acute kidney injury)


Is this a current diagnosis for this admission?: Yes   


Summary: 


Currently all resolved








(2) Hyponatremia


Is this a current diagnosis for this admission?: Yes   


Summary: 


Currently all resolved








(3) Uncontrolled hypertension


Is this a current diagnosis for this admission?: Yes   


Summary: 


Currently all improving stay in 140-150 range will readjust the hydralazine the 

next week slowly








(4) Hyperlipidemia


Is this a current diagnosis for this admission?: Yes   


Summary: 


Continues to current medications








(5) Hypothyroidism


Is this a current diagnosis for this admission?: Yes   


Summary: 


Currently stable








- Additional Information


Resuscitation Status: Full Code


Discharge Diet: Cardiac


Discharge Activity: Activity As Tolerated


Prescriptions: 


Amlodipine Besylate [Norvasc 5 mg Tablet] 5 mg PO Q12 #60 tablet


Cephalexin Monohydrate [Keflex 500 mg Capsule] 500 mg PO Q12 #10 capsule


Hydralazine HCl [Apresoline 25 mg Tablet] 25 mg PO Q8 #90 tablet


Home Medications: 








Levothyroxine Sodium [Synthroid 0.025 mg Tablet] 0.025 mg PO Q6AM 02/20/18 


Omeprazole 40 mg PO DAILY 02/20/18 


Pravastatin Sodium [Pravachol] 40 mg PO DAILY 02/20/18 


Amlodipine Besylate [Norvasc 5 mg Tablet] 5 mg PO Q12 #60 tablet 02/22/18 


Cephalexin Monohydrate [Keflex 500 mg Capsule] 500 mg PO Q12 #10 capsule 02/22/ 18 


Hydralazine HCl [Apresoline 25 mg Tablet] 25 mg PO Q8 #90 tablet 02/22/18 











History of Present Illness


History of Present Illness: 


ONEL GORDON is a 78 year old female


This 78-year-old females came to the office last month up to 4 years not taking 

any blood pressure medications and blood pressure was running 190/110 range 

with completely asymptomatic patient'sAnd the patient's initially started on 

her Diovan HCTZ which patient was taking exforge  in the pastAnd then will get 

down to slowly week by week for the last 3 weeks the blood pressures to 150-160 

range and also add the Norvasc 5 mg twice a day


Last Monday patient's sodium was completely normal and creatinine was 1.03 with 

a recheck again the patient's creatinine was good to up to 1.69 and patient's 

sodium was 120


Patient's brought back to the office patient was complaining some mild weakness 

and some dizziness decided to send to the emergency departments will repeat 

sodium was 127 but patient's creatinine was 2.69 and decided to admit for 

further evaluations for the acute renal failure and hyponatremia


Patient's denied any chest pain denied any shortness of the breath denied any 

headache but complaining some dizzy feeling


Patient also have a hypothyroidism which is uncontrolled with the patient's 

came was about 35 TSH and patient was started on the levothyroxine's coming 

back to the 11 range


When I saw the patient on the floor patients feeling better after starting the 

IV fluidsDenied any headache denied any bloody wheezing denied any dizziness 

denied any chest pain





Hospital Course


Hospital Course: 





This is a 78-year-old female was basically admitted because of hyponatremia and 

uncontrolled hypertension's in the renal failure


Patient was started on IV fluids and stop the Diovan and patients pretty much 

improved all the symptoms and renal failure is all resolved and hyponatremia is 

all resolved


Patient hctzwas also stopped most likely related to that side effect with the 

hyponatremia


Patient have a vascular ultrasound done to rule out any renal artery stenosis 

was also negative's and a CT of the head and carotid Doppler was all negative


Patient echocardiogram was all stable except some mild aortic regurgitations 

follow-up outpatients cardiology


This with the patient and the daughter about the patient's current condition 

and the changing in the medications


Otherwise patient's doing well walking the hallway with some physical therapy 

and fall precautions





Physical Exam


Vital Signs: 


 











Temp Pulse Resp BP Pulse Ox


 


 97.8 F   85   16   169/63 H  100 


 


 02/22/18 07:23  02/22/18 07:23  02/22/18 07:23  02/22/18 07:23  02/22/18 07:23








 Intake & Output











 02/21/18 02/22/18 02/23/18





 06:59 06:59 06:59


 


Intake Total 2569 2925 


 


Balance 2569 2925 


 


Weight 79.3 kg 80.1 kg 











General appearance: PRESENT: no acute distress, well-developed, well-nourished


Head exam: PRESENT: atraumatic, normocephalic


Eye exam: PRESENT: conjunctiva pink, EOMI, PERRLA.  ABSENT: scleral icterus


Ear exam: PRESENT: normal external ear exam


Mouth exam: PRESENT: moist, tongue midline


Neck exam: PRESENT: full ROM.  ABSENT: carotid bruit, JVD, lymphadenopathy, 

thyromegaly


Respiratory exam: PRESENT: clear to auscultation jus


Cardiovascular exam: PRESENT: RRR.  ABSENT: diastolic murmur, rubs, systolic 

murmur


Pulses: PRESENT: normal dorsalis pedis pul, +2 pedal pulses bilateral


Vascular exam: PRESENT: normal capillary refill


GI/Abdominal exam: PRESENT: normal bowel sounds, soft.  ABSENT: distended, 

guarding, mass, organolmegaly, rebound, tenderness


Rectal exam: PRESENT: deferred


Extremities exam: ABSENT: full ROM, left AKA, right AKA, left BKA, right BKA, 

calf tenderness, joint swelling, pedal edema, tenderness, other


Musculoskeletal exam: PRESENT: ambulatory


Neurological exam: PRESENT: alert, awake, oriented to person, oriented to place

, oriented to time, oriented to situation, CN II-XII grossly intact.  ABSENT: 

motor sensory deficit


Psychiatric exam: PRESENT: appropriate affect, normal mood.  ABSENT: homicidal 

ideation, suicidal ideation


Skin exam: PRESENT: dry, intact, warm.  ABSENT: cyanosis, rash





Results


Laboratory Results: 


 





 02/22/18 09:07 





 02/22/18 09:07 





 











  02/22/18 02/22/18





  09:07 09:07


 


WBC  7.5 


 


RBC  3.59 L 


 


Hgb  11.6 L 


 


Hct  34.8 L 


 


MCV  97 


 


MCH  32.4 


 


MCHC  33.4 


 


RDW  14.4 H 


 


Plt Count  272 


 


Seg Neutrophils %  66.6 


 


Lymphocytes %  23.4 


 


Monocytes %  7.7 


 


Eosinophils %  1.3 


 


Basophils %  1.0 


 


Absolute Neutrophils  5.0 


 


Absolute Lymphocytes  1.8 


 


Absolute Monocytes  0.6 


 


Absolute Eosinophils  0.1 


 


Absolute Basophils  0.1 


 


Sodium   136.9 L


 


Potassium   3.9


 


Chloride   101


 


Carbon Dioxide   24


 


Anion Gap   12


 


BUN   12


 


Creatinine   1.14


 


Est GFR ( Amer)   56 L


 


Est GFR (Non-Af Amer)   46 L


 


Glucose   108


 


Calcium   9.4











Impressions: 


 





Head CT  02/19/18 00:00


IMPRESSION:


1.  No acute intracranial abnormality by CT criteria.


 


This exam was performed according to our departmental


dose-optimization program, which includes automated exposure


control, adjustment of the mA and/or kV according to patient size


and/or use of iterative reconstruction technique.


 








Renal Ultrasound  02/19/18 22:01


IMPRESSION:


 


1. Possible echogenic debris in the urinary bladder. Correlation


with urinalysis recommended.


 


2. No definite abnormality identified in the kidneys.


 








Carotid Doppler Study  02/20/18 00:00


IMPRESSION:  NO HEMODYNAMICALLY SIGNIFICANT STENOSIS.


 








Vascular Ultrasound  02/20/18 00:00


IMPRESSION:  NO DOPPLER EVIDENCE OF HEMODYNAMICALLY SIGNIFICANT RENAL ARTERY 

STENOSIS.


 














Qualifiers





- *


**PATEINT BEING DISCHARGED WITH ANY OF THE FOLLOWING DIAGNOSIS?: No





Plan


Time Spent: Greater than 30 Minutes - Discussed with the patient about the all 

current conditions and discussed with the daughters follow outpatient in 1 week 

will repeat the Chem-7 in the readjust the blood pressure medications

## 2018-06-02 ENCOUNTER — HOSPITAL ENCOUNTER (EMERGENCY)
Dept: HOSPITAL 62 - ER | Age: 78
Discharge: HOME | End: 2018-06-02
Payer: MEDICARE

## 2018-06-02 VITALS — DIASTOLIC BLOOD PRESSURE: 80 MMHG | SYSTOLIC BLOOD PRESSURE: 146 MMHG

## 2018-06-02 DIAGNOSIS — Z87.891: ICD-10-CM

## 2018-06-02 DIAGNOSIS — Z88.6: ICD-10-CM

## 2018-06-02 DIAGNOSIS — R60.0: ICD-10-CM

## 2018-06-02 DIAGNOSIS — I10: ICD-10-CM

## 2018-06-02 DIAGNOSIS — R42: Primary | ICD-10-CM

## 2018-06-02 DIAGNOSIS — I51.7: ICD-10-CM

## 2018-06-02 LAB
ADD MANUAL DIFF: NO
ALBUMIN SERPL-MCNC: 4.1 G/DL (ref 3.5–5)
ALP SERPL-CCNC: 87 U/L (ref 38–126)
ALT SERPL-CCNC: 17 U/L (ref 9–52)
ANION GAP SERPL CALC-SCNC: 13 MMOL/L (ref 5–19)
APPEARANCE UR: CLEAR
APTT PPP: COLORLESS S
AST SERPL-CCNC: 29 U/L (ref 14–36)
BASOPHILS # BLD AUTO: 0.1 10^3/UL (ref 0–0.2)
BASOPHILS NFR BLD AUTO: 0.8 % (ref 0–2)
BILIRUB DIRECT SERPL-MCNC: 0.4 MG/DL (ref 0–0.4)
BILIRUB SERPL-MCNC: 0.7 MG/DL (ref 0.2–1.3)
BILIRUB UR QL STRIP: NEGATIVE
BUN SERPL-MCNC: 14 MG/DL (ref 7–20)
CALCIUM: 9.3 MG/DL (ref 8.4–10.2)
CHLORIDE SERPL-SCNC: 101 MMOL/L (ref 98–107)
CK MB SERPL-MCNC: < 0.22 NG/ML (ref ?–4.55)
CK SERPL-CCNC: 26 U/L (ref 30–135)
CO2 SERPL-SCNC: 32 MMOL/L (ref 22–30)
EOSINOPHIL # BLD AUTO: 0.3 10^3/UL (ref 0–0.6)
EOSINOPHIL NFR BLD AUTO: 3.8 % (ref 0–6)
ERYTHROCYTE [DISTWIDTH] IN BLOOD BY AUTOMATED COUNT: 15.6 % (ref 11.5–14)
GLUCOSE SERPL-MCNC: 99 MG/DL (ref 75–110)
GLUCOSE UR STRIP-MCNC: NEGATIVE MG/DL
HCT VFR BLD CALC: 32.6 % (ref 36–47)
HGB BLD-MCNC: 10.7 G/DL (ref 12–15.5)
KETONES UR STRIP-MCNC: NEGATIVE MG/DL
LYMPHOCYTES # BLD AUTO: 1.8 10^3/UL (ref 0.5–4.7)
LYMPHOCYTES NFR BLD AUTO: 21 % (ref 13–45)
MCH RBC QN AUTO: 32.4 PG (ref 27–33.4)
MCHC RBC AUTO-ENTMCNC: 32.6 G/DL (ref 32–36)
MCV RBC AUTO: 99 FL (ref 80–97)
MONOCYTES # BLD AUTO: 0.7 10^3/UL (ref 0.1–1.4)
MONOCYTES NFR BLD AUTO: 8.2 % (ref 3–13)
NEUTROPHILS # BLD AUTO: 5.6 10^3/UL (ref 1.7–8.2)
NEUTS SEG NFR BLD AUTO: 66.2 % (ref 42–78)
NITRITE UR QL STRIP: NEGATIVE
PH UR STRIP: 7 [PH] (ref 5–9)
PLATELET # BLD: 277 10^3/UL (ref 150–450)
POTASSIUM SERPL-SCNC: 3.4 MMOL/L (ref 3.6–5)
PROT SERPL-MCNC: 7.9 G/DL (ref 6.3–8.2)
PROT UR STRIP-MCNC: NEGATIVE MG/DL
RBC # BLD AUTO: 3.28 10^6/UL (ref 3.72–5.28)
SODIUM SERPL-SCNC: 145.7 MMOL/L (ref 137–145)
SP GR UR STRIP: 1
TOTAL CELLS COUNTED % (AUTO): 100 %
TROPONIN I SERPL-MCNC: < 0.012 NG/ML
UROBILINOGEN UR-MCNC: NEGATIVE MG/DL (ref ?–2)
WBC # BLD AUTO: 8.5 10^3/UL (ref 4–10.5)

## 2018-06-02 PROCEDURE — 87086 URINE CULTURE/COLONY COUNT: CPT

## 2018-06-02 PROCEDURE — 85025 COMPLETE CBC W/AUTO DIFF WBC: CPT

## 2018-06-02 PROCEDURE — 82550 ASSAY OF CK (CPK): CPT

## 2018-06-02 PROCEDURE — 87088 URINE BACTERIA CULTURE: CPT

## 2018-06-02 PROCEDURE — 36415 COLL VENOUS BLD VENIPUNCTURE: CPT

## 2018-06-02 PROCEDURE — 80053 COMPREHEN METABOLIC PANEL: CPT

## 2018-06-02 PROCEDURE — 84484 ASSAY OF TROPONIN QUANT: CPT

## 2018-06-02 PROCEDURE — 99284 EMERGENCY DEPT VISIT MOD MDM: CPT

## 2018-06-02 PROCEDURE — 93005 ELECTROCARDIOGRAM TRACING: CPT

## 2018-06-02 PROCEDURE — 93010 ELECTROCARDIOGRAM REPORT: CPT

## 2018-06-02 PROCEDURE — 82553 CREATINE MB FRACTION: CPT

## 2018-06-02 PROCEDURE — 81001 URINALYSIS AUTO W/SCOPE: CPT

## 2018-06-02 PROCEDURE — 84443 ASSAY THYROID STIM HORMONE: CPT

## 2018-06-02 NOTE — ER DOCUMENT REPORT
ED Medical Screen (RME)





- General


Chief Complaint: Blood Pressure Problem


Stated Complaint: BLOOD PRESSURE ISSUE


Time Seen by Provider: 18 09:50


Mode of Arrival: Ambulatory


Information source: Patient


Notes: 





78-year-old female history of hypertension on amlodipine frusemide and 

hydralazine presents with complaints of high blood pressure over the past few 

weeks with sensation of lightheadedness today and decreased appetite patient 

denies any chest pain shortness of breath difficulty breathing, denies any 

headaches neurological deficits








I have greeted and performed a rapid initial assessment of this patient.  A 

comprehensive ED assessment and evaluation of the patient, analysis of test 

results and completion of the medical decision making process will be conducted 

by additional ED providers.





PHYSICAL EXAMINATION:





GENERAL: Well-appearing, well-nourished and in no acute distress.  Blood 

pressure 154/75





HEAD: Atraumatic, normocephalic.





EYES: Pupils equal round extraocular movements intact,  conjunctiva are normal.





ENT: Nares patent





NECK: Normal range of motion





LUNGS: No respiratory distress





Musculoskeletal: Normal range of motion





NEUROLOGICAL:  Normal speech, normal gait. 





PSYCH: Normal mood, normal affect.





SKIN: Warm, Dry, normal turgor, no rashes or lesions noted.


TRAVEL OUTSIDE OF THE [place] IN LAST 30 DAYS: No





- Related Data


Allergies/Adverse Reactions: 


 





aspirin Allergy (Verified 18 09:50)


 











Past Medical History





- Social History


Chew tobacco use (# tins/day): No


Frequency of alcohol use: None


Drug Abuse: None





- Past Medical History


Cardiac Medical History: Reports: Hx Hypercholesterolemia, Hx Hypertension


Renal/ Medical History: Denies: Hx Peritoneal Dialysis


GI Medical History: Reports: Hx Gastroesophageal Reflux Disease


Past Surgical History: Reports: Hx  Section, Hx Cholecystectomy





- Immunizations


History of Influenza Vaccine for 10/2017 - 3/2018 Season: Refused





Physical Exam





- Vital signs


Vitals: 





 











Temp Pulse Resp BP Pulse Ox


 


 98.7 F   81   16   152/75 H  97 


 


 18 09:43  18 09:43  18 09:43  18 09:43  18 09:43














Course





- Vital Signs


Vital signs: 





 











Temp Pulse Resp BP Pulse Ox


 


 98.7 F   81   16   152/75 H  97 


 


 18 09:43  18 09:43  18 09:43  18 09:43  18 09:43














Doctor's Discharge





- Discharge


Referrals: 


IVONNE KENDRICK MD [Primary Care Provider] - Follow up as needed

## 2018-06-02 NOTE — EKG REPORT
SEVERITY:- ABNORMAL ECG -

SINUS RHYTHM

LVH WITH SECONDARY REPOLARIZATION ABNORMALITY

:

Confirmed by: Philip Gonsalez 02-Jun-2018 17:29:12

## 2018-06-02 NOTE — ER DOCUMENT REPORT
ED General





- General


Chief Complaint: Blood Pressure Problem


Stated Complaint: BLOOD PRESSURE ISSUE


Time Seen by Provider: 18 09:50


Mode of Arrival: Ambulatory


Information source: Patient, Relative - Grandson


Notes: 





78-year-old hypothyroid, hypertensive, hyperlipidemic female patient of Dr. Kendrick had a episode of lightheadedness for a few seconds which caused her to 

stagger backwards.  She did not fall.  She called her daughter who sent the 

grandson and girlfriend over to the house.  The girlfriend took the blood 

pressure was 184/90 something.  The patient denies symptoms at this time.  No 

headache, dizziness, vertigo, sore throat, runny nose, cough, chest pain, 

shortness of breath, nausea, vomiting, diarrhea, abdominal pain, dysuria. No 

blood or black in stools. No hx anemia.


TRAVEL OUTSIDE OF THE [place] IN LAST 30 DAYS: No





- Related Data


Allergies/Adverse Reactions: 


 





aspirin Allergy (Verified 18 09:50)


 











Past Medical History





- General


Information source: Patient





- Social History


Smoking Status: Former Smoker


Chew tobacco use (# tins/day): No


Frequency of alcohol use: None


Drug Abuse: None


Lives with: Family


Family History: None, Reviewed & Not Pertinent


Patient has suicidal ideation: No


Patient has homicidal ideation: No





- Past Medical History


Cardiac Medical History: Reports: Hx Hypercholesterolemia, Hx Hypertension


Renal/ Medical History: Denies: Hx Peritoneal Dialysis


GI Medical History: Reports: Hx Gastroesophageal Reflux Disease


Past Surgical History: Reports: Hx  Section, Hx Cholecystectomy





Review of Systems





- Review of Systems


Constitutional: No symptoms reported


EENT: No symptoms reported


Cardiovascular: No symptoms reported


Respiratory: No symptoms reported


Gastrointestinal: No symptoms reported


Genitourinary: No symptoms reported


Female Genitourinary: No symptoms reported


Musculoskeletal: No symptoms reported


Skin: No symptoms reported


Hematologic/Lymphatic: No symptoms reported


Neurological/Psychological: See HPI





Physical Exam





- Vital signs


Vitals: 


 











Temp Pulse Resp BP Pulse Ox


 


 98.7 F   81   16   152/75 H  97 


 


 18 09:43  18 09:43  18 09:43  18 09:43  18 09:43











Interpretation: Normal





- General


General appearance: Appears well, Alert





- HEENT


Head: Normocephalic, Atraumatic


Eyes: Normal


Conjunctiva: Normal


Pupils: PERRL


Tympanic membrane: Normal


Mouth/Lips: Normal


Pharynx: Normal


Neck: Supple.  No: Lymphadenopathy





- Respiratory


Respiratory status: No respiratory distress


Chest status: Nontender


Breath sounds: Normal


Chest palpation: Normal





- Cardiovascular


Rhythm: Regular


Heart sounds: Normal auscultation


Murmur: No





- Abdominal


Inspection: Normal


Distension: No distension


Bowel sounds: Normal


Tenderness: Nontender.  No: Tender


Organomegaly: No organomegaly





- Back


Back: Normal, Nontender.  No: CVA tenderness





- Extremities


General upper extremity: Normal inspection, Nontender, Normal color, Normal ROM

, Normal temperature


General lower extremity: Nontender, Edema - Bilateral lower legs mild edema, 

Normal color, Normal ROM, Normal temperature, Normal weight bearing.  No: Cynthia'

s sign





- Neurological


Neuro grossly intact: Yes


Cognition: Normal


Orientation: AAOx4


Orick Coma Scale Eye Opening: Spontaneous


Zoltan Coma Scale Verbal: Oriented


Orick Coma Scale Motor: Obeys Commands


Orick Coma Scale Total: 15


Speech: Normal


Motor strength normal: LUE, RUE, LLE, RLE


Sensory: Normal





- Psychological


Associated symptoms: Normal affect, Normal mood





- Skin


Skin Temperature: Warm


Skin Moisture: Dry


Skin Color: Normal


Skin irregularity: negative: Rash





Course





- Re-evaluation


Re-evalutation: 





18 10:42


EKG shows normal sinus rhythm with LVH, no acute change, , QTc 470, no 

old EKG to compare to this EKG.


18 11:39


Patient has been up to the bathroom without dizziness.  She has not had any of 

the symptoms while here in the density department her hemoglobin is 10.7 which 

we have no record of her being that low before.  She has not had any bloody 

stools or black stools.  I am going to call and speak with Dr. Kendrick do so that 

he knows about this patient.  Chemistry is normal except a potassium of 3.4.  

Urine is negative for infection.


18 11:48


Spoke with Dr. Kendrick and discuss the hemoglobin symptoms and workup done today.

  He states that she has dizzy episodes in the past.  That she is fine going 

home and he will see her next week and I explained this to the patient.  Her 

grandson will come take her home.





- Vital Signs


Vital signs: 


 











Temp Pulse Resp BP Pulse Ox


 


 98.7 F   81   15   153/66 H  98 


 


 18 09:43  18 09:43  18 11:01  18 11:01  18 10:18














- Laboratory


Result Diagrams: 


 18 10:14





 18 10:14


Laboratory results interpreted by me: 


 











  18





  10:05 10:14 10:14


 


RBC   3.28 L 


 


Hgb   10.7 L 


 


Hct   32.6 L 


 


MCV   99 H 


 


RDW   15.6 H 


 


Sodium    145.7 H


 


Potassium    3.4 L


 


Carbon Dioxide    32 H


 


Est GFR (Non-Af Amer)    52 L


 


Creatine Kinase    26 L


 


Ur Leukocyte Esterase  SMALL H  














Discharge





- Discharge


Clinical Impression: 


 Episode of dizziness





Condition: Good


Disposition: HOME, SELF-CARE


Instructions:  Dizziness ([facility])


Additional Instructions: 


Dr. Kendrick want to do to be seen in his office next week


Return to the emergency room this weekend for any concerns or symptoms


Referrals: 


IVONNE KENDRICK MD [Primary Care Provider] - 18

## 2020-02-11 ENCOUNTER — HOSPITAL ENCOUNTER (EMERGENCY)
Dept: HOSPITAL 62 - ER | Age: 80
LOS: 1 days | Discharge: HOME | End: 2020-02-12
Payer: MEDICARE

## 2020-02-11 DIAGNOSIS — J98.01: ICD-10-CM

## 2020-02-11 DIAGNOSIS — T46.5X6A: ICD-10-CM

## 2020-02-11 DIAGNOSIS — T46.1X6A: ICD-10-CM

## 2020-02-11 DIAGNOSIS — R44.0: ICD-10-CM

## 2020-02-11 DIAGNOSIS — B97.89: ICD-10-CM

## 2020-02-11 DIAGNOSIS — Z91.128: ICD-10-CM

## 2020-02-11 DIAGNOSIS — T50.1X6A: ICD-10-CM

## 2020-02-11 DIAGNOSIS — Z87.891: ICD-10-CM

## 2020-02-11 DIAGNOSIS — Z88.8: ICD-10-CM

## 2020-02-11 DIAGNOSIS — J06.9: ICD-10-CM

## 2020-02-11 DIAGNOSIS — R06.2: ICD-10-CM

## 2020-02-11 DIAGNOSIS — I11.9: Primary | ICD-10-CM

## 2020-02-11 DIAGNOSIS — Z91.14: ICD-10-CM

## 2020-02-11 DIAGNOSIS — R06.02: ICD-10-CM

## 2020-02-11 LAB
ADD MANUAL DIFF: NO
ALBUMIN SERPL-MCNC: 4.4 G/DL (ref 3.5–5)
ALP SERPL-CCNC: 76 U/L (ref 38–126)
ANION GAP SERPL CALC-SCNC: 8 MMOL/L (ref 5–19)
APAP SERPL-MCNC: < 10 UG/ML (ref 10–30)
APPEARANCE UR: (no result)
APTT PPP: YELLOW S
AST SERPL-CCNC: 22 U/L (ref 14–36)
BARBITURATES UR QL SCN: NEGATIVE
BASOPHILS # BLD AUTO: 0 10^3/UL (ref 0–0.2)
BASOPHILS NFR BLD AUTO: 0.4 % (ref 0–2)
BILIRUB DIRECT SERPL-MCNC: 0.2 MG/DL (ref 0–0.4)
BILIRUB SERPL-MCNC: 0.8 MG/DL (ref 0.2–1.3)
BILIRUB UR QL STRIP: NEGATIVE
BUN SERPL-MCNC: 33 MG/DL (ref 7–20)
CALCIUM: 9.6 MG/DL (ref 8.4–10.2)
CHLORIDE SERPL-SCNC: 100 MMOL/L (ref 98–107)
CO2 SERPL-SCNC: 31 MMOL/L (ref 22–30)
EOSINOPHIL # BLD AUTO: 0.2 10^3/UL (ref 0–0.6)
EOSINOPHIL NFR BLD AUTO: 2.9 % (ref 0–6)
ERYTHROCYTE [DISTWIDTH] IN BLOOD BY AUTOMATED COUNT: 16.3 % (ref 11.5–14)
ETHANOL SERPL-MCNC: < 10 MG/DL
GLUCOSE SERPL-MCNC: 89 MG/DL (ref 75–110)
GLUCOSE UR STRIP-MCNC: NEGATIVE MG/DL
HCT VFR BLD CALC: 38.7 % (ref 36–47)
HGB BLD-MCNC: 12.8 G/DL (ref 12–15.5)
KETONES UR STRIP-MCNC: NEGATIVE MG/DL
LYMPHOCYTES # BLD AUTO: 1.6 10^3/UL (ref 0.5–4.7)
LYMPHOCYTES NFR BLD AUTO: 26.7 % (ref 13–45)
MCH RBC QN AUTO: 32.2 PG (ref 27–33.4)
MCHC RBC AUTO-ENTMCNC: 33.1 G/DL (ref 32–36)
MCV RBC AUTO: 97 FL (ref 80–97)
METHADONE UR QL SCN: NEGATIVE
MONOCYTES # BLD AUTO: 0.5 10^3/UL (ref 0.1–1.4)
MONOCYTES NFR BLD AUTO: 8 % (ref 3–13)
NEUTROPHILS # BLD AUTO: 3.8 10^3/UL (ref 1.7–8.2)
NEUTS SEG NFR BLD AUTO: 62 % (ref 42–78)
NITRITE UR QL STRIP: NEGATIVE
PCP UR QL SCN: NEGATIVE
PH UR STRIP: 5 [PH] (ref 5–9)
PLATELET # BLD: 216 10^3/UL (ref 150–450)
POTASSIUM SERPL-SCNC: 4.5 MMOL/L (ref 3.6–5)
PROT SERPL-MCNC: 8.4 G/DL (ref 6.3–8.2)
PROT UR STRIP-MCNC: 100 MG/DL
RBC # BLD AUTO: 3.98 10^6/UL (ref 3.72–5.28)
SALICYLATES SERPL-MCNC: < 1 MG/DL (ref 2–20)
SP GR UR STRIP: 1.02
TOTAL CELLS COUNTED % (AUTO): 100 %
URINE AMPHETAMINES SCREEN: NEGATIVE
URINE BENZODIAZEPINES SCREEN: NEGATIVE
URINE COCAINE SCREEN: NEGATIVE
URINE MARIJUANA (THC) SCREEN: NEGATIVE
UROBILINOGEN UR-MCNC: NEGATIVE MG/DL (ref ?–2)
WBC # BLD AUTO: 6.1 10^3/UL (ref 4–10.5)

## 2020-02-11 PROCEDURE — 99285 EMERGENCY DEPT VISIT HI MDM: CPT

## 2020-02-11 PROCEDURE — 81001 URINALYSIS AUTO W/SCOPE: CPT

## 2020-02-11 PROCEDURE — 80307 DRUG TEST PRSMV CHEM ANLYZR: CPT

## 2020-02-11 PROCEDURE — 85025 COMPLETE CBC W/AUTO DIFF WBC: CPT

## 2020-02-11 PROCEDURE — 80053 COMPREHEN METABOLIC PANEL: CPT

## 2020-02-11 PROCEDURE — 93005 ELECTROCARDIOGRAM TRACING: CPT

## 2020-02-11 PROCEDURE — 93010 ELECTROCARDIOGRAM REPORT: CPT

## 2020-02-11 PROCEDURE — 71046 X-RAY EXAM CHEST 2 VIEWS: CPT

## 2020-02-11 PROCEDURE — 36415 COLL VENOUS BLD VENIPUNCTURE: CPT

## 2020-02-11 NOTE — ER DOCUMENT REPORT
ED Medical Screen (RME)





- General


Chief Complaint: High Blood Pressure


Stated Complaint: WHEEZING


Time Seen by Provider: 20 18:26


Primary Care Provider: 


IVONNE KENDRICK MD [Primary Care Provider] - Follow up as needed


Mode of Arrival: Ambulatory


Information source: Patient, Relative - daughter


Notes: 





80-year-old female presents emergency department with wheeze shortness of breath

for the past month.  Also hearing people talking that are not there.  Patient 

reports she quit taking her medications about a month ago because she did not 

have the right food.  No complaints of fever vomiting or diarrhea.


Patient is very pleasant calm laughs easily.








I have greeted and performed a rapid initial assessment of this patient.  A 

comprehensive ED assessment and evaluation of the patient, analysis of test 

results and completion of the medical decision making process will be conducted 

by additional ED providers.


TRAVEL OUTSIDE OF THE [place] IN LAST 30 DAYS: No





- Related Data


Allergies/Adverse Reactions: 


                                        





aspirin Allergy (Verified 20 18:19)


   











Past Medical History





- Social History


Chew tobacco use (# tins/day): No


Frequency of alcohol use: None


Drug Abuse: None





- Past Medical History


Cardiac Medical History: Reports: Hx Hypercholesterolemia, Hx Hypertension


Renal/ Medical History: Denies: Hx Peritoneal Dialysis


GI Medical History: Reports: Hx Gastroesophageal Reflux Disease


Past Surgical History: Reports: Hx  Section, Hx Cholecystectomy





Physical Exam





- Vital signs


Vitals: 





                                        











Temp Pulse Resp BP Pulse Ox


 


 98.7 F   75   16   234/81 H  99 


 


 20 18:10  20 18:10  20 18:10  20 18:10  20 18:10














Course





- Vital Signs


Vital signs: 





                                        











Temp Pulse Resp BP Pulse Ox


 


 98.7 F   75   16   234/81 H  99 


 


 20 18:10  20 18:10  20 18:10  20 18:10  20 18:10














Doctor's Discharge





- Discharge


Referrals: 


IVONNE KENDRICK MD [Primary Care Provider] - Follow up as needed

## 2020-02-11 NOTE — EKG REPORT
SEVERITY:- ABNORMAL ECG -

SINUS RHYTHM

RAA, CONSIDER BIATRIAL ABNORMALITIES

LEFT VENTRICULAR HYPERTROPHY

:

Confirmed by: Oscar Tay MD 11-Feb-2020 22:20:26

## 2020-02-11 NOTE — RADIOLOGY REPORT (SQ)
EXAM DESCRIPTION:  CHEST 2 VIEWS



COMPLETED DATE/TIME:  2/11/2020 6:58 pm



REASON FOR STUDY:  sob



COMPARISON:  None.



EXAM PARAMETERS:  NUMBER OF VIEWS: two views

TECHNIQUE: Digital Frontal and Lateral radiographic views of the chest acquired.

RADIATION DOSE: NA

LIMITATIONS: none



FINDINGS:  LUNGS AND PLEURA: No opacities, masses or pneumothorax. No pleural effusion.

MEDIASTINUM AND HILAR STRUCTURES: No masses or contour abnormalities.

HEART AND VASCULAR STRUCTURES: Heart normal size.  No evidence for failure.

BONES: No acute findings.

HARDWARE: None in the chest.

OTHER: No other significant finding.



IMPRESSION:  NO ACUTE RADIOGRAPHIC FINDING IN THE CHEST.



TECHNICAL DOCUMENTATION:  JOB ID:  [id number]

 2011 [employer]- All Rights Reserved



Reading location - IP/workstation name: [email or web address]

## 2020-02-12 VITALS — DIASTOLIC BLOOD PRESSURE: 75 MMHG | SYSTOLIC BLOOD PRESSURE: 170 MMHG

## 2020-02-12 NOTE — ER DOCUMENT REPORT
Entered by MERRILL BOLAND SCRIBE  20 0219 





Acting as scribe for:SONIA KEMP MD





ED General





- General


Chief Complaint: High Blood Pressure


Stated Complaint: WHEEZING


Time Seen by Provider: 20 18:26


Primary Care Provider: 


IVONNE KENDRICK MD [Primary Care Provider] - Follow up as needed


Mode of Arrival: Ambulatory


Information source: Patient, Relative


Notes: 


80-year-old female presents to the emergency department with her family 

complaining of wheezing and high blood pressure. Patient stated that she "wanted

to get checked out for her breathing condition". Patient states that "as time 

goes by it gets heavier and then goes away" when asked about her wheezing. 

Patient states that she has medication for her blood pressure but does not take 

her medication as prescribed. Patient cannot recall last time she took her blood

pressure medication. Patient reports SOB which is worsened when she "uses her 

walker". Patient denies chest pain and sputum. Patient's daughter states that 

patient has been having auditory hallucinations of a man's voice at home when 

she is by herself. Patient denies frequency or burning with urination and 

dysuria. 


TRAVEL OUTSIDE OF THE [place] IN LAST 30 DAYS: No





- Related Data


Allergies/Adverse Reactions: 


                                        





aspirin Allergy (Verified 20 18:19)


   











Past Medical History





- General


Information source: Patient, Relative - daughter





- Social History


Smoking Status: Former Smoker


Cigarette use (# per day): No


Chew tobacco use (# tins/day): No


Frequency of alcohol use: None


Drug Abuse: None


Lives with: Alone


Family History: None, Reviewed & Not Pertinent


Patient has suicidal ideation: No


Patient has homicidal ideation: No





- Past Medical History


Cardiac Medical History: Reports: Hx Hypercholesterolemia, Hx Hypertension


GI Medical History: Reports: Hx Gastroesophageal Reflux Disease


Past Surgical History: Reports: Hx  Section, Hx Cholecystectomy, Hx 

Hysterectomy





Review of Systems





- Review of Systems


Constitutional: No symptoms reported


EENT: No symptoms reported


Cardiovascular: See HPI, Chest pain


Respiratory: See HPI, Short of breath, Wheezing.  denies: Sputum


Gastrointestinal: No symptoms reported


Genitourinary: See HPI.  denies: Burning, Dysuria, Frequency


Female Genitourinary: No symptoms reported


Musculoskeletal: No symptoms reported


Skin: No symptoms reported


Hematologic/Lymphatic: No symptoms reported


Neurological/Psychological: See HPI, Hallucinations - Auditory


-: Yes All other systems reviewed and negative





Physical Exam





- Vital signs


Vitals: 


                                        











Temp Pulse Resp BP Pulse Ox


 


 98.7 F   75   16   234/81 H  99 


 


 20 18:10  20 18:10  20 18:10  20 18:10  20 18:10














- Notes


Notes: 


Physical Exam:


 


General: Alert, appears well. 


 


HEENT: Normocephalic. Atraumatic. PERRL. Extraocular movements intact. 

Oropharynx clear.


 


Neck: Supple. Non-tender.


 


Respiratory: No respiratory distress. Mild wheezing bilaterally.


 


Cardiovascular: Regular rate and rhythm. 


 


Abdominal: Normal Inspection. Non-tender. No distension. Normal Bowel Sounds. 


 


Back: No gross abnormalities. 


 


Extremities: Moves all four extremities.


Upper extremities: Normal inspection. Normal ROM.  


Lower extremities: Normal inspection. No edema. Normal ROM.


 


Neurological: Normal cognition. AAOx4. Normal speech.  


 


Psychological: Normal affect. Normal Mood. 


 


Skin: Warm. Dry. Normal color.





Course





- Re-evaluation


Re-evalutation: 





20 02:41


Repeat exam patient has trace inspiratory expiratory wheeze.  No acute 

respiratory distress sats are 98% in room air.  Was given her blood pressure 

medications including hydralazine Lasix and nifedipine.  Encourage patient to be

compliant with taking her blood pressure medications on a daily basis.  Most 

likely patient has no viral URI with trace wheezing at this time.  Plan is to 

put her on 5-day course of prednisone.





- Vital Signs


Vital signs: 


                                        











Temp Pulse Resp BP Pulse Ox


 


 97.7 F   71   16   221/83 H  97 


 


 20 01:26  20 01:26  20 01:26  20 01:26  20 01:26














- Laboratory


Result Diagrams: 


                                 20 21:24





                                 20 21:24


Laboratory results interpreted by me: 


                                        











  20





  21:24 21:24 21:24


 


RDW  16.3 H  


 


Carbon Dioxide   31 H 


 


BUN   33 H 


 


Est GFR ( Amer)   56 L 


 


Est GFR (MDRD) Non-Af   46 L 


 


Total Protein   8.4 H 


 


Urine Protein    100 H


 


Urine Blood    SMALL H


 


Ur Leukocyte Esterase    LARGE H


 


Salicylates   < 1.0 L 


 


Acetaminophen   < 10 L 














- Diagnostic Test


Radiology reviewed: Image reviewed, Reports reviewed


Radiology results interpreted by me: 





20 02:40


Chest x-ray shows no acute process no acute cardiopulmonary disease present no 

evidence for congestive heart failure.





- EKG Interpretation by Me


Additional EKG results interpreted by me: 





20 02:40


Twelve-lead EKG done on 2020 at 2058 normal sinus rhythm rate of 68 right 

atrial abnormality and left ventricular hypertrophy no other acute ST-T wave 

changes.





Discharge





- Discharge


Clinical Impression: 


 Viral URI, Acute bronchospasm due to viral infection, Uncontrolled hypertension





Condition: Stable


Disposition: HOME, SELF-CARE


Instructions:  Upper Respiratory Illness ([facility]), High Blood Pressure ([facility])


Additional Instructions: 


Bronchospasm





     You have tightness in the bronchial tubes, called bronchospasm. This often 

occurs with bronchial infections.  Allergies, inhaled chemicals, and polluted or

cold air can also provoke bronchospasm. It's more likely in patients with asthma

in the family.


     Emergency treatment of bronchospasm may include adrenaline shots or bro

nchodilator aerosol.  You may feel lightheaded and have a rapid pulse for an 

hour or two.  Rest and get plenty of fluids.


     At home, we'll treat you with a bronchodilator inhaler. Antibiotics and c

orticosteroids may be required for some patients. Until you recover, avoid 

chemical fumes, dusts, pollens, and exercising in very cold or dry air. If you 

smoke, stop now!!


     If you develop a fever, increased wheezing, chest pain, or severe shortness

of breath, you should contact the doctor immediately.





Prescriptions: 


Prednisone [Deltasone 20 mg Tablet] 1 tab PO DAILY 5 Days #5 tablet


Forms:  Elevated Blood Pressure


Referrals: 


IVONNE KENDRICK MD [Primary Care Provider] - Follow up as needed





I personally performed the services described in the documentation, reviewed and

edited the documentation which was dictated to the scribe in my presence, and it

accurately records my words and actions.

## 2020-11-09 ENCOUNTER — HOSPITAL ENCOUNTER (EMERGENCY)
Dept: HOSPITAL 62 - ER | Age: 80
Discharge: HOME | End: 2020-11-09
Payer: MEDICARE

## 2020-11-09 VITALS — SYSTOLIC BLOOD PRESSURE: 236 MMHG | DIASTOLIC BLOOD PRESSURE: 86 MMHG

## 2020-11-09 DIAGNOSIS — S09.90XA: Primary | ICD-10-CM

## 2020-11-09 DIAGNOSIS — Z87.891: ICD-10-CM

## 2020-11-09 DIAGNOSIS — R42: ICD-10-CM
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
## 2020-11-09 LAB
ADD MANUAL DIFF: NO
ALBUMIN SERPL-MCNC: 4.1 G/DL (ref 3.5–5)
ALP SERPL-CCNC: 61 U/L (ref 38–126)
ANION GAP SERPL CALC-SCNC: 9 MMOL/L (ref 5–19)
APPEARANCE UR: CLEAR
APTT PPP: YELLOW S
AST SERPL-CCNC: 19 U/L (ref 14–36)
BASOPHILS # BLD AUTO: 0 10^3/UL (ref 0–0.2)
BASOPHILS NFR BLD AUTO: 0.6 % (ref 0–2)
BILIRUB DIRECT SERPL-MCNC: 0.1 MG/DL (ref 0–0.4)
BILIRUB SERPL-MCNC: 1 MG/DL (ref 0.2–1.3)
BILIRUB UR QL STRIP: NEGATIVE
BUN SERPL-MCNC: 18 MG/DL (ref 7–20)
CALCIUM: 9 MG/DL (ref 8.4–10.2)
CHLORIDE SERPL-SCNC: 99 MMOL/L (ref 98–107)
CO2 SERPL-SCNC: 29 MMOL/L (ref 22–30)
EOSINOPHIL # BLD AUTO: 0.2 10^3/UL (ref 0–0.6)
EOSINOPHIL NFR BLD AUTO: 3.4 % (ref 0–6)
ERYTHROCYTE [DISTWIDTH] IN BLOOD BY AUTOMATED COUNT: 14.9 % (ref 11.5–14)
GLUCOSE SERPL-MCNC: 80 MG/DL (ref 75–110)
GLUCOSE UR STRIP-MCNC: NEGATIVE MG/DL
HCT VFR BLD CALC: 36.3 % (ref 36–47)
HGB BLD-MCNC: 12.4 G/DL (ref 12–15.5)
KETONES UR STRIP-MCNC: NEGATIVE MG/DL
LYMPHOCYTES # BLD AUTO: 1.1 10^3/UL (ref 0.5–4.7)
LYMPHOCYTES NFR BLD AUTO: 22.8 % (ref 13–45)
MCH RBC QN AUTO: 33.6 PG (ref 27–33.4)
MCHC RBC AUTO-ENTMCNC: 34 G/DL (ref 32–36)
MCV RBC AUTO: 99 FL (ref 80–97)
MONOCYTES # BLD AUTO: 0.5 10^3/UL (ref 0.1–1.4)
MONOCYTES NFR BLD AUTO: 10.4 % (ref 3–13)
NEUTROPHILS # BLD AUTO: 2.9 10^3/UL (ref 1.7–8.2)
NEUTS SEG NFR BLD AUTO: 62.8 % (ref 42–78)
NITRITE UR QL STRIP: NEGATIVE
PH UR STRIP: 6 [PH] (ref 5–9)
PLATELET # BLD: 170 10^3/UL (ref 150–450)
POTASSIUM SERPL-SCNC: 3.8 MMOL/L (ref 3.6–5)
PROT SERPL-MCNC: 7.2 G/DL (ref 6.3–8.2)
PROT UR STRIP-MCNC: NEGATIVE MG/DL
RBC # BLD AUTO: 3.68 10^6/UL (ref 3.72–5.28)
SP GR UR STRIP: 1.01
TOTAL CELLS COUNTED % (AUTO): 100 %
UROBILINOGEN UR-MCNC: NEGATIVE MG/DL (ref ?–2)
WBC # BLD AUTO: 4.6 10^3/UL (ref 4–10.5)

## 2020-11-09 PROCEDURE — 93010 ELECTROCARDIOGRAM REPORT: CPT

## 2020-11-09 PROCEDURE — 99285 EMERGENCY DEPT VISIT HI MDM: CPT

## 2020-11-09 PROCEDURE — 72125 CT NECK SPINE W/O DYE: CPT

## 2020-11-09 PROCEDURE — 36415 COLL VENOUS BLD VENIPUNCTURE: CPT

## 2020-11-09 PROCEDURE — 84484 ASSAY OF TROPONIN QUANT: CPT

## 2020-11-09 PROCEDURE — 85025 COMPLETE CBC W/AUTO DIFF WBC: CPT

## 2020-11-09 PROCEDURE — 70450 CT HEAD/BRAIN W/O DYE: CPT

## 2020-11-09 PROCEDURE — 81001 URINALYSIS AUTO W/SCOPE: CPT

## 2020-11-09 PROCEDURE — 80053 COMPREHEN METABOLIC PANEL: CPT

## 2020-11-09 PROCEDURE — 93005 ELECTROCARDIOGRAM TRACING: CPT

## 2020-11-09 NOTE — RADIOLOGY REPORT (SQ)
EXAM DESCRIPTION:  CT CERVICAL SPINE WITHOUT



IMAGES COMPLETED DATE/TIME:  11/9/2020 5:19 pm



REASON FOR STUDY:  head injury



COMPARISON:  None.



TECHNIQUE:  Axial images acquired through the cervical spine without intravenous contrast.  Images re
viewed with lung, soft tissue and bone windows.  Reconstructed coronal and sagittal MPR images review
ed.  Images stored on PACS.

All CT scanners at this facility use dose modulation, iterative reconstruction, and/or weight based d
osing when appropriate to reduce radiation dose to as low as reasonably achievable (ALARA).

[facility]: Dose Right  [facility]: CareDose    [facility]: Dose Right    [facility]: Teradose 4D    [facility]: Smart SuperLikers



RADIATION DOSE:  CT Rad equipment meets quality standard of care and radiation dose reduction techniq
ues were employed. CTDIvol: 18.0 mGy. DLP: 308 mGy-cm. mGy.



LIMITATIONS:  None.



FINDINGS:  ALIGNMENT: Anatomic.

MINERALIZATION: Normal.

VERTEBRAL BODIES: No fractures or dislocation.

DISCS: Is narrowing of the disc spaces from C4-C7 with marginal osteophytes.

FACETS, LATERAL MASSES, POSTERIOR ELEMENTS: No fractures or dislocations or other acute findings.  Th
ere is spina bifida occulta at C1.

HARDWARE: None in the spine.

VISUALIZED RIBS: No fractures.

LUNG APICES AND SOFT TISSUES: No significant or acute findings.

OTHER: No other significant finding.



IMPRESSION:  Degenerative disc disease and spondylosis.  No acute findings.



TECHNICAL DOCUMENTATION:  JOB ID:  [id number]

Quality ID # 436: Final reports with documentation of one or more dose reduction techniques (e.g., Au
tomated exposure control, adjustment of the mA and/or kV according to patient size, use of iterative 
reconstruction technique)

 2011 [employer]- All Rights Reserved



Reading location - IP/workstation name: [email or web address]

## 2020-11-09 NOTE — RADIOLOGY REPORT (SQ)
EXAM DESCRIPTION:  CT HEAD WITHOUT



IMAGES COMPLETED DATE/TIME:  11/9/2020 5:19 pm



REASON FOR STUDY:  head injury



COMPARISON:  2/19/2018



TECHNIQUE:  Axial images acquired through the brain without intravenous contrast.  Images reviewed wi
th bone, brain and subdural windows.  Additional sagittal and coronal reconstructions were generated.
 Images stored on PACS.

All CT scanners at this facility use dose modulation, iterative reconstruction, and/or weight based d
osing when appropriate to reduce radiation dose to as low as reasonably achievable (ALARA).

[facility]: Dose Right  [facility]: CareDose    [facility]: Dose Right    [facility]: Teradose 4D    [facility]: Smart ERLink



RADIATION DOSE:  CT Rad equipment meets quality standard of care and radiation dose reduction techniq
ues were employed. CTDIvol: 53.2 mGy. DLP: 911 mGy-cm.



LIMITATIONS:  None.



FINDINGS:  VENTRICLES: Normal size and contour.  The cisterns are patent.

CEREBRUM:  Chronic small vessel ischemic changes, stable findings.  Old small relief remote lacunes. 
 No masses.  No hemorrhage.  No midline shift.  No evidence for acute infarction.

CEREBELLUM: No masses.  No hemorrhage.  No alteration of density.  No evidence for acute infarction.

EXTRAAXIAL SPACES:  Age related involutional change.  No fluid collections.  No masses.

ORBITS AND GLOBE: No intra- or extraconal masses.  Normal contour of globe without masses.

CALVARIUM: No fracture.

PARANASAL SINUSES:  Small mucous retention cyst or polyp in the right maxillary sinus.

SOFT TISSUES: No mass or hematoma.

OTHER:  Incidentally, partial empty sella.  Mild atherosclerotic changes involving the cavernous port
ion of the internal carotid arteries.



IMPRESSION:  1.  No significant interval changes since the prior examination dated 2/19/2018.  No acu
te intracranial abnormality.

2.  Atrophy, old small remote lacunes, and chronic small vessel ischemic changes.

EVIDENCE OF ACUTE STROKE: NO



COMMENT:  Quality ID # 436: Final reports with documentation of one or more dose reduction techniques
 (e.g., Automated exposure control, adjustment of the mA and/or kV according to patient size, use of 
iterative reconstruction technique)



TECHNICAL DOCUMENTATION:  JOB ID:  [id number]

 2011 [employer]- All Rights Reserved



Reading location - IP/workstation name: [email or web address]

## 2020-11-09 NOTE — EKG REPORT
SEVERITY:- ABNORMAL ECG -

SINUS ARRHYTHMIA, RATE 52-61

SINUS PAUSE WITH JUNCTIONAL/LOW ATRIAL ESCAPE

JOSE, CONSIDER BIATRIAL ABNORMALITIES

LVH WITH SECONDARY REPOLARIZATION ABNORMALITY

:

Confirmed by: Philip Gonsalez 09-Nov-2020 22:45:05

## 2020-11-09 NOTE — ER DOCUMENT REPORT
ED General





- General


Stated Complaint: FALL/HEAD INJURY


Time Seen by Provider: 20 16:05


Primary Care Provider: 


IVONNE KENDRICK MD [Primary Care Provider] - Follow up as needed


TRAVEL OUTSIDE OF THE [place] IN LAST 30 DAYS: No





- HPI


Notes: 





Patient is a 80-year-old female who presents to the emergency department for 

evaluation.  She got up to go to the bathroom.  She was walking down the valladares 

she became dizzy.  She went to grab a hold of the doorknob to balance herself, 

missed, and fell backwards, striking her head.  She denies losing consciousness.

 She was only on the ground for a minute or 2.  She was able to scoot to the 

bathroom, then was walking around on her own power.  She has minimal pain in the

posterior head and neck.  Patient also remarks that she has not taken her blood 

pressure medication for at least a month.  She states she is Monday to her 

primary care provider's office, and has not been there as a result.  She denies 

any chest pain or shortness of breath.  She denies any difficulty seeing, 

speaking, swallowing.  She is moving her arms and legs without difficulty.  She 

complains of some numbness in her left fingertips which has been ongoing for 

some time, she cannot further qualify this for me.





- Related Data


Allergies/Adverse Reactions: 


                                        





aspirin Allergy (Verified 20 18:19)


   











Past Medical History





- General


Information source: Patient





- Social History


Smoking Status: Former Smoker


Family History: None, Reviewed & Not Pertinent





- Past Medical History


Cardiac Medical History: Reports: Hx Hypercholesterolemia, Hx Hypertension


Renal/ Medical History: Denies: Hx Peritoneal Dialysis


GI Medical History: Reports: Hx Gastroesophageal Reflux Disease


Past Surgical History: Reports: Hx  Section, Hx Cholecystectomy, Hx 

Hysterectomy





Review of Systems





- Review of Systems


Constitutional: No symptoms reported


EENT: No symptoms reported


Cardiovascular: See HPI


Respiratory: No symptoms reported


Gastrointestinal: No symptoms reported


Genitourinary: No symptoms reported


Musculoskeletal: See HPI


Skin: No symptoms reported


Neurological/Psychological: See HPI


-: Yes All other systems reviewed and negative





Physical Exam





- Vital signs


Vitals: 


                                        











Temp Pulse Resp BP Pulse Ox


 


 97.6 F   66   16   236/86 H  100 


 


 20 15:08  20 15:08  20 15:08  20 15:08  11/09/20 15:08














- Notes


Notes: 





Vital signs reviewed, please refer to chart. Head is normocephalic, atraumatic. 

I do not appreciate any hematomas or tenderness to the posterior occiput.  Pupi

ls equal round, reactive to light.  Examination of the cervical spine is no 

midline tenderness or step-off.  No paraspinal musculature tenderness is 

appreciated.  No significant pain with rotation, flexion, extension, axial 

loading..  Heart is regular rate and rhythm.  Lungs are clear to auscultation 

bilaterally.  Abdomen is soft, nontender, normoactive bowel sounds throughout.  

Extremities without cyanosis, clubbing. Posterior calves are nontender.  

Peripheral pulses are equal.  Skin is warm and dry.  Patient is awake, alert, 

oriented x3.  Cranial nerves II - XII are grossly intact without focal 

neurological deficits.  Strength is plus 4 out of 5 bilateral upper and lower 

extremities.  Sensation is intact.  Reflexes symmetrical.  Intact 

finger-nose-finger, rapid alternating movements, heel-to-shin.





Course





- Re-evaluation


Re-evalutation: 





20 19:14


Patient presents to the emergency department for evaluation.  Laboratory 

investigations, EKG, imaging obtained.  Imaging fails to reveal any signs of 

acute fracture or intracranial bleeding.  EKG shows longstanding LVH but no 

acute ST changes.  Laboratory investigations are largely unremarkable.  

Patient's blood pressure was markedly elevated.  I looked through her history, 

this is not a new problem for her.  I am sure this is largely in part secondary 

to noncompliance with her medication regimen.  We talked at length about this 

issue.  She states she may need to obtain a new primary care provider.  I told 

her I would give her the name of our on-call physician, I encouraged her to 

speak to her friends and family regarding their primary care provider if she 

needs a new doctor.  She voiced understanding.  Otherwise she is given a dose of

Apresoline as well as Norvasc.  I will write her prescriptions for same.  She is

to return to the emergency department with worsening or new concerning symptoms 

of any sort.





- Vital Signs


Vital signs: 


                                        











Temp Pulse Resp BP Pulse Ox


 


 97.5 F   66   16   236/86 H  100 


 


 20 19:50  20 15:08  20 15:08  20 15:08  11/09/20 15:08














- Laboratory


Result Diagrams: 


                                 20 17:01





                                 20 17:01


Laboratory results interpreted by me: 


                                        











  20





  17:01 17:01 17:01


 


RBC  3.68 L  


 


MCV  99 H  


 


MCH  33.6 H  


 


RDW  14.9 H  


 


Est GFR ( Amer)   52 L 


 


Est GFR (MDRD) Non-Af   43 L 


 


Ur Leukocyte Esterase    MODERATE H














- Diagnostic Test


Radiology reviewed: Reports reviewed


Radiology results interpreted by me: 





20 19:15





                                        





Cervical Spine CT  20 16:14


IMPRESSION:  Degenerative disc disease and spondylosis.  No acute findings.


 








Head CT  20 16:14


IMPRESSION:  1.  No significant interval changes since the prior examination 

dated 2018.  No acute intracranial abnormality.


2.  Atrophy, old small remote lacunes, and chronic small vessel ischemic 

changes.


EVIDENCE OF ACUTE STROKE: NO


 














- EKG Interpretation by Me


Additional EKG results interpreted by me: 





20 19:15


Sinus arrhythmia with a rate of 59 bpm.  Left axis deviation.  Criteria for LVH.

 Nonspecific T wave flattening.  No acute ST elevation concerning for 

infarction.  No significant change in compared to prior study of 2020.





Discharge





- Discharge


Clinical Impression: 


 Uncontrolled hypertension, Dizziness





Closed head injury


Qualifiers:


 Encounter type: initial encounter Qualified Code(s): S09.90XA - Unspecified 

injury of head, initial encounter





Condition: Stable


Disposition: HOME, SELF-CARE


Instructions:  Dizziness ([facility]), High Blood Pressure, Requiring Treatment ([facility])


Additional Instructions: 


No clear cause was found for your dizziness today.  Please take your blood 

pressure medications at home as prescribed.  It is important that your blood 

pressure be better controlled, to reduce your risk of heart attack, stroke, 

kidney disease.  Follow-up with primary care next week.  Return to the emergency

department with worsening or new concerning symptoms of any sort.


Prescriptions: 


Hydralazine HCl [Apresoline 25 mg Tablet] 25 mg PO Q8 #90 tablet


Amlodipine Besylate [Norvasc 5 mg Tablet] 5 mg PO Q12 #60 tablet


Referrals: 


IVONNE KENDRICK MD [Primary Care Provider] - Follow up as needed


GABRIELA DURHAM MD [ACTIVE STAFF] - Follow up as needed


MELISSA HAMILTON MD [ACTIVE STAFF] - Follow up as needed

## 2020-11-09 NOTE — ER DOCUMENT REPORT
ED Medical Screen (RME)





- General


Stated Complaint: FALL/HEAD INJURY


Time Seen by Provider: 20 16:05


Primary Care Provider: 


IVONNE KENDRICK MD [Primary Care Provider] - Follow up as needed


TRAVEL OUTSIDE OF THE [place] IN LAST 30 DAYS: No





- HPI


Notes: 





Patient is a 79 y/o female with a hx of HTN who presents s/p fall.  Patient 

states she was walking to the bathroom when she became lightheaded.  She tried 

grabbing on to something but fell back and hit her head. She is complaining of 

head and neck pain.  She denies chest pain, shortness of breath, nausea, 

vomiting and fever. She denies taking any blood thinners. 





- Related Data


Allergies/Adverse Reactions: 


                                        





aspirin Allergy (Verified 20 18:19)


   











Past Medical History





- Past Medical History


Cardiac Medical History: Reports: Hx Hypercholesterolemia, Hx Hypertension


Renal/ Medical History: Denies: Hx Peritoneal Dialysis


GI Medical History: Reports: Hx Gastroesophageal Reflux Disease


Past Surgical History: Reports: Hx  Section, Hx Cholecystectomy, Hx 

Hysterectomy





Physical Exam





- Vital signs


Vitals: 





                                        











Temp Pulse Resp BP Pulse Ox


 


 97.6 F   66   16   236/86 H  100 


 


 20 15:08  20 15:08  20 15:08  20 15:08  20 15:08














- HEENT


Head: Atraumatic





- Respiratory


Respiratory status: No respiratory distress


Breath sounds: Normal





- Cardiovascular


Rhythm: Regular


Heart sounds: Normal auscultation





- Neurological


Additional motor exam normals: Equal 


Notes: 





Normal but slow gait, ambulates with a walker





Course





- Re-evaluation


Re-evalutation: 


Patient's blood pressure in triage is 266/81 on left arm and 236/86 on right 

arm. Patient upgraded to a level 2 and charge nurse notified of her need to be 

brought back to a room. 





I have greeted and performed a rapid initial assessment of this patient.  A 

comprehensive ED assessment and evaluation of the patient, analysis of test 

results and completion of medical decision making process will be conducted by 

an additional ED providers.











- Vital Signs


Vital signs: 





                                        











Temp Pulse Resp BP Pulse Ox


 


 97.6 F   66   16   236/86 H  100 


 


 20 15:08  20 15:08  20 15:08  20 15:08  20 15:08














Doctor's Discharge





- Discharge


Referrals: 


IVONNE KENDRICK MD [Primary Care Provider] - Follow up as needed